# Patient Record
Sex: MALE | Race: WHITE | Employment: OTHER | RURAL
[De-identification: names, ages, dates, MRNs, and addresses within clinical notes are randomized per-mention and may not be internally consistent; named-entity substitution may affect disease eponyms.]

---

## 2017-01-13 ENCOUNTER — CLINICAL SUPPORT (OUTPATIENT)
Dept: FAMILY MEDICINE CLINIC | Age: 52
End: 2017-01-13

## 2017-01-13 DIAGNOSIS — Z91.030 BEE STING ALLERGY: ICD-10-CM

## 2017-01-13 DIAGNOSIS — E11.9 TYPE 2 DIABETES MELLITUS WITHOUT COMPLICATION, WITHOUT LONG-TERM CURRENT USE OF INSULIN (HCC): ICD-10-CM

## 2017-01-13 DIAGNOSIS — E78.00 HYPERCHOLESTEROLEMIA: ICD-10-CM

## 2017-01-13 RX ORDER — EPINEPHRINE 0.3 MG/.3ML
0.3 INJECTION SUBCUTANEOUS
Qty: 2 SYRINGE | Status: SHIPPED | OUTPATIENT
Start: 2017-01-13 | End: 2019-06-03 | Stop reason: SDUPTHER

## 2017-01-13 NOTE — TELEPHONE ENCOUNTER
Pt would a refill for generic epi pen sent to [facility] in [address] [place].

## 2017-01-16 LAB
ALBUMIN SERPL-MCNC: 4.5 G/DL (ref 3.5–5.5)
ALBUMIN/GLOB SERPL: 2 {RATIO} (ref 1.1–2.5)
ALP SERPL-CCNC: 42 IU/L (ref 39–117)
ALT SERPL-CCNC: 62 IU/L (ref 0–44)
AST SERPL-CCNC: 36 IU/L (ref 0–40)
BASOPHILS # BLD AUTO: 0 X10E3/UL (ref 0–0.2)
BASOPHILS NFR BLD AUTO: 1 %
BILIRUB SERPL-MCNC: 0.4 MG/DL (ref 0–1.2)
BUN SERPL-MCNC: 11 MG/DL (ref 6–24)
BUN/CREAT SERPL: 11 (ref 9–20)
CALCIUM SERPL-MCNC: 9.6 MG/DL (ref 8.7–10.2)
CHLORIDE SERPL-SCNC: 98 MMOL/L (ref 96–106)
CHOLEST SERPL-MCNC: 195 MG/DL (ref 100–199)
CO2 SERPL-SCNC: 23 MMOL/L (ref 18–29)
CREAT SERPL-MCNC: 0.96 MG/DL (ref 0.76–1.27)
EOSINOPHIL # BLD AUTO: 0.4 X10E3/UL (ref 0–0.4)
EOSINOPHIL NFR BLD AUTO: 4 %
ERYTHROCYTE [DISTWIDTH] IN BLOOD BY AUTOMATED COUNT: 14.3 % (ref 12.3–15.4)
EST. AVERAGE GLUCOSE BLD GHB EST-MCNC: 134 MG/DL
GLOBULIN SER CALC-MCNC: 2.2 G/DL (ref 1.5–4.5)
GLUCOSE SERPL-MCNC: 109 MG/DL (ref 65–99)
HBA1C MFR BLD: 6.3 % (ref 4.8–5.6)
HCT VFR BLD AUTO: 43.2 % (ref 37.5–51)
HCV AB S/CO SERPL IA: <0.1 S/CO RATIO (ref 0–0.9)
HDLC SERPL-MCNC: 26 MG/DL
HGB BLD-MCNC: 15.4 G/DL (ref 12.6–17.7)
IMM GRANULOCYTES # BLD: 0 X10E3/UL (ref 0–0.1)
IMM GRANULOCYTES NFR BLD: 1 %
INTERPRETATION, 910389: NORMAL
LDLC SERPL CALC-MCNC: 91 MG/DL (ref 0–99)
LYMPHOCYTES # BLD AUTO: 3.5 X10E3/UL (ref 0.7–3.1)
LYMPHOCYTES NFR BLD AUTO: 43 %
MCH RBC QN AUTO: 29.4 PG (ref 26.6–33)
MCHC RBC AUTO-ENTMCNC: 35.6 G/DL (ref 31.5–35.7)
MCV RBC AUTO: 82 FL (ref 79–97)
MONOCYTES # BLD AUTO: 0.5 X10E3/UL (ref 0.1–0.9)
MONOCYTES NFR BLD AUTO: 7 %
NEUTROPHILS # BLD AUTO: 3.5 X10E3/UL (ref 1.4–7)
NEUTROPHILS NFR BLD AUTO: 44 %
PLATELET # BLD AUTO: 192 X10E3/UL (ref 150–379)
POTASSIUM SERPL-SCNC: 4.5 MMOL/L (ref 3.5–5.2)
PROT SERPL-MCNC: 6.7 G/DL (ref 6–8.5)
RBC # BLD AUTO: 5.24 X10E6/UL (ref 4.14–5.8)
SODIUM SERPL-SCNC: 142 MMOL/L (ref 134–144)
TRIGL SERPL-MCNC: 389 MG/DL (ref 0–149)
VLDLC SERPL CALC-MCNC: 78 MG/DL (ref 5–40)
WBC # BLD AUTO: 8 X10E3/UL (ref 3.4–10.8)

## 2017-01-16 NOTE — PROGRESS NOTES
Notify pt, A1c is in pre-diabetic range, which is very good with already dx with DM. Would like to keep below 7 to maintain good glycemic control. Triglycerides are pretty high, \"good\" chol is low, and \"bad\" chol is nl. Rec Omega 3 supp. Exercise and avoid foods that are high in trans/sat fats. Also rec diff cholesterol med to one that addresses the trig more, please confirm he has not had any problems with any of the others and I can send in new med or he may rtc/call to discuss further. One liver test elev- avoid ETOH and Tylenol and recheck in 1mo. CBC looks okay. Hep C neg.

## 2017-02-28 ENCOUNTER — OFFICE VISIT (OUTPATIENT)
Dept: FAMILY MEDICINE CLINIC | Age: 52
End: 2017-02-28

## 2017-02-28 VITALS
DIASTOLIC BLOOD PRESSURE: 83 MMHG | HEIGHT: 68 IN | SYSTOLIC BLOOD PRESSURE: 125 MMHG | TEMPERATURE: 97.5 F | RESPIRATION RATE: 16 BRPM | BODY MASS INDEX: 32.92 KG/M2 | WEIGHT: 217.2 LBS | HEART RATE: 83 BPM | OXYGEN SATURATION: 97 %

## 2017-02-28 DIAGNOSIS — E78.00 HYPERCHOLESTEROLEMIA: ICD-10-CM

## 2017-02-28 DIAGNOSIS — M43.16 SPONDYLOLISTHESIS OF LUMBAR REGION: ICD-10-CM

## 2017-02-28 DIAGNOSIS — G89.29 CHRONIC MIDLINE LOW BACK PAIN WITH LEFT-SIDED SCIATICA: Primary | ICD-10-CM

## 2017-02-28 DIAGNOSIS — M54.42 CHRONIC MIDLINE LOW BACK PAIN WITH LEFT-SIDED SCIATICA: Primary | ICD-10-CM

## 2017-02-28 RX ORDER — INDOMETHACIN 50 MG/1
50 CAPSULE ORAL 3 TIMES DAILY
Qty: 30 CAP | Refills: 0 | Status: SHIPPED | OUTPATIENT
Start: 2017-02-28 | End: 2017-03-10

## 2017-02-28 RX ORDER — ATORVASTATIN CALCIUM 20 MG/1
20 TABLET, FILM COATED ORAL DAILY
Qty: 30 TAB | Refills: 1 | Status: SHIPPED | OUTPATIENT
Start: 2017-02-28 | End: 2017-04-18 | Stop reason: DRUGHIGH

## 2017-02-28 RX ORDER — GABAPENTIN 100 MG/1
100 CAPSULE ORAL 3 TIMES DAILY
COMMUNITY
Start: 2017-02-02 | End: 2017-05-18

## 2017-02-28 NOTE — PATIENT INSTRUCTIONS
Learning About Relief for Back Pain  What is back tension and strain? Back strain happens when you overstretch, or pull, a muscle in your back. You may hurt your back in an accident or when you exercise or lift something. Most back pain will get better with rest and time. You can take care of yourself at home to help your back heal.  What can you do first to relieve back pain? When you first feel back pain, try these steps:  · Walk. Take a short walk (10 to 20 minutes) on a level surface (no slopes, hills, or stairs) every 2 to 3 hours. Walk only distances you can manage without pain, especially leg pain. · Relax. Find a comfortable position for rest. Some people are comfortable on the floor or a medium-firm bed with a small pillow under their head and another under their knees. Some people prefer to lie on their side with a pillow between their knees. Don't stay in one position for too long. · Try heat or ice. Try using a heating pad on a low or medium setting, or take a warm shower, for 15 to 20 minutes every 2 to 3 hours. Or you can buy single-use heat wraps that last up to 8 hours. You can also try an ice pack for 10 to 15 minutes every 2 to 3 hours. You can use an ice pack or a bag of frozen vegetables wrapped in a thin towel. There is not strong evidence that either heat or ice will help, but you can try them to see if they help. You may also want to try switching between heat and cold. · Take pain medicine exactly as directed. ¨ If the doctor gave you a prescription medicine for pain, take it as prescribed. ¨ If you are not taking a prescription pain medicine, ask your doctor if you can take an over-the-counter medicine. What else can you do? · Stretch and exercise. Exercises that increase flexibility may relieve your pain and make it easier for your muscles to keep your spine in a good, neutral position. And don't forget to keep walking. · Do self-massage.  You can use self-massage to unwind after work or school or to energize yourself in the morning. You can easily massage your feet, hands, or neck. Self-massage works best if you are in comfortable clothes and are sitting or lying in a comfortable position. Use oil or lotion to massage bare skin. · Reduce stress. Back pain can lead to a vicious [place]: Distress about the pain tenses the muscles in your back, which in turn causes more pain. Learn how to relax your mind and your muscles to lower your stress. Where can you learn more? Go to http://roberth-sandro.info/. Enter V079 in the search box to learn more about \"Learning About Relief for Back Pain. \"  Current as of: May 23, 2016  Content Version: 11.1  © 9710-9310 [employer]. Care instructions adapted under license by [employer] (which disclaims liability or warranty for this information). If you have questions about a medical condition or this instruction, always ask your healthcare professional. [place] [address] any warranty or liability for your use of this information. Learning About How to Have a Healthy Back  What causes back pain? Back pain is often caused by overuse, strain, or injury. For example, people often hurt their backs playing sports or working in the yard, being jolted in a car accident, or lifting something too heavy. Aging plays a part too. Your bones and muscles tend to lose strength as you age, which makes injury more likely. The spongy discs between the bones of the spine (vertebrae) may suffer from wear and tear and no longer provide enough cushion between the bones. A disc that bulges or breaks open (herniated disc) can press on nerves, causing back pain. In some people, back pain is the result of arthritis, broken vertebrae caused by bone loss (osteoporosis), illness, or a spine problem.   Although most people have back pain at one time or another, there are steps you can take to make it less likely. How can you have a healthy back? Reduce stress on your back through good posture  Slumping or slouching alone may not cause low back pain. But after the back has been strained or injured, bad posture can make pain worse. · Sleep in a position that maintains your back's normal curves and on a mattress that feels comfortable. Sleep on your side with a pillow between your knees, or sleep on your back with a pillow under your knees. These positions can reduce strain on your back. · Stand and sit up straight. \"Good posture\" generally means your ears, shoulders, and hips are in a straight line. · If you must stand for a long time, put one foot on a stool, ledge, or box. Switch feet every now and then. · Sit in a chair that is low enough to let you place both feet flat on the floor with both knees nearly level with your hips. If your chair or desk is too high, use a footrest to raise your knees. Place a small pillow, a rolled-up towel, or a lumbar roll in the curve of your back if you need extra support. · Try a kneeling chair, which helps tilt your hips forward. This takes pressure off your lower back. · Try sitting on an exercise ball. It can rock from side to side, which helps keep your back loose. · When driving, keep your knees nearly level with your hips. Sit straight, and drive with both hands on the steering wheel. Your arms should be in a slightly bent position. Reduce stress on your back through careful lifting  · Squat down, bending at the hips and knees only. If you need to, put one knee to the floor and extend your other knee in front of you, bent at a right angle (half kneeling). · Press your chest straight forward. This helps keep your upper back straight while keeping a slight arch in your low back. · Hold the load as close to your body as possible, at the level of your belly button (navel). · Use your feet to change direction, taking small steps.   · Lead with your hips as you change direction. Keep your shoulders in line with your hips as you move. · Set down your load carefully, squatting with your knees and hips only. Exercise and stretch your back  · Do some exercise on most days of the week, if your doctor says it is okay. You can walk, run, swim, or cycle. · Stretch your back muscles. Here are a few exercises to try:  Clovia Evener on your back, and gently pull one bent knee to your chest. Put that foot back on the floor, and then pull the other knee to your chest.  ¨ Do pelvic tilts. Lie on your back with your knees bent. Tighten your stomach muscles. Pull your belly button (navel) in and up toward your ribs. You should feel like your back is pressing to the floor and your hips and pelvis are slightly lifting off the floor. Hold for 6 seconds while breathing smoothly. ¨ Sit with your back flat against a wall. · Keep your core muscles strong. The muscles of your back, belly (abdomen), and buttocks support your spine. ¨ Pull in your belly and imagine pulling your navel toward your spine. Hold this for 6 seconds, then relax. Remember to keep breathing normally as you tense your muscles. ¨ Do curl-ups. Always do them with your knees bent. Keep your low back on the floor, and curl your shoulders toward your knees using a smooth, slow motion. Keep your arms folded across your chest. If this bothers your neck, try putting your hands behind your neck (not your head), with your elbows spread apart. ¨ Lie on your back with your knees bent and your feet flat on the floor. Tighten your belly muscles, and then push with your feet and raise your buttocks up a few inches. Hold this position 6 seconds as you continue to breathe normally, then lower yourself slowly to the floor. Repeat 8 to 12 times. ¨ If you like group exercise, try Pilates or yoga. These classes have poses that strengthen the core muscles. Lead a healthy lifestyle  · Stay at a healthy weight to avoid strain on your back.   · Do not smoke. Smoking increases the risk of osteoporosis, which weakens the spine. If you need help quitting, talk to your doctor about stop-smoking programs and medicines. These can increase your chances of quitting for good. Where can you learn more? Go to http://roberth-sandro.info/. Enter L315 in the search box to learn more about \"Learning About How to Have a Healthy Back. \"  Current as of: May 23, 2016  Content Version: 11.1  © 5562-1506 [employer]. Care instructions adapted under license by [employer] (which disclaims liability or warranty for this information). If you have questions about a medical condition or this instruction, always ask your healthcare professional. [place] [address] any warranty or liability for your use of this information. Back Stretches: Exercises  Your Care Instructions  Here are some examples of exercises for stretching your back. Start each exercise slowly. Ease off the exercise if you start to have pain. Your doctor or physical therapist will tell you when you can start these exercises and which ones will work best for you. How to do the exercises  Overhead stretch    1. Stand comfortably with your feet shoulder-width apart. 2. Looking straight ahead, raise both arms over your head and reach toward the ceiling. Do not allow your head to tilt back. 3. Hold for 15 to 30 seconds, then lower your arms to your sides. 4. Repeat 2 to 4 times. Side stretch    1. Stand comfortably with your feet shoulder-width apart. 2. Raise one arm over your head, and then lean to the other side. 3. Slide your hand down your leg as you let the weight of your arm gently stretch your side muscles. Hold for 15 to 30 seconds. 4. Repeat 2 to 4 times on each side. Press-up    1. Lie on your stomach, supporting your body with your forearms. 2. Press your elbows down into the floor to raise your upper back.  As you do this, relax your stomach muscles and allow your back to arch without using your back muscles. As your press up, do not let your hips or pelvis come off the floor. 3. Hold for 15 to 30 seconds, then relax. 4. Repeat 2 to 4 times. Relax and rest    1. Lie on your back with a rolled towel under your neck and a pillow under your knees. Extend your arms comfortably to your sides. 2. Relax and breathe normally. 3. Remain in this position for about 10 minutes. 4. If you can, do this 2 or 3 times each day. Follow-up care is a key part of your treatment and safety. Be sure to make and go to all appointments, and call your doctor if you are having problems. It's also a good idea to know your test results and keep a list of the medicines you take. Where can you learn more? Go to http://roberth-sandro.info/. Enter G349 in the search box to learn more about \"Back Stretches: Exercises. \"  Current as of: May 23, 2016  Content Version: 11.1  © 0421-9428 [employer]. Care instructions adapted under license by [employer] (which disclaims liability or warranty for this information). If you have questions about a medical condition or this instruction, always ask your healthcare professional. [address] any warranty or liability for your use of this information.

## 2017-02-28 NOTE — MR AVS SNAPSHOT
Visit Information Date & Time Provider Department Dept. Phone Encounter #  
 2/28/2017  3:00 PM Anne-Marie Hyatt PA-C [address] [id number] Upcoming Health Maintenance Date Due HEMOGLOBIN A1C Q6M 7/13/2017 FOOT EXAM Q1 7/14/2017 MICROALBUMIN Q1 7/14/2017 FOBT Q 1 YEAR AGE 50-75 8/26/2017 EYE EXAM RETINAL OR DILATED Q1 10/4/2017 LIPID PANEL Q1 1/13/2018 DTaP/Tdap/Td series (2 - Td) 12/27/2026 Allergies as of 2/28/2017  Review Complete On: 2/28/2017 By: Anne-Marie Hyatt PA-C Severity Noted Reaction Type Reactions Venom-honey Bee High 07/14/2016    Anaphylaxis Current Immunizations  Never Reviewed Name Date Influenza High Dose Vaccine PF 9/14/2015 Not reviewed this visit You Were Diagnosed With   
  
 Codes Comments Chronic midline low back pain with left-sided sciatica    -  Primary ICD-10-CM: M54.42, G89.29 ICD-9-CM: 724.2, 724.3, 338.29 Spondylolisthesis of lumbar region     ICD-10-CM: M43.16 
ICD-9-CM: 738.4 Hypercholesterolemia     ICD-10-CM: E78.00 ICD-9-CM: 272.0 Vitals BP  
  
  
  
  
  
 125/83 (BP 1 Location: Left arm, BP Patient Position: Sitting) BMI and BSA Data Body Mass Index Body Surface Area 33.03 kg/m 2 2.17 m 2 Preferred Pharmacy Pharmacy Name Phone [facility] [place] [address] [phone] Your Updated Medication List  
  
   
This list is accurate as of: 2/28/17  3:52 PM.  Always use your most recent med list.  
  
  
  
  
 ABILIFY 15 mg tablet Generic drug:  ARIPiprazole Take 15 mg by mouth daily. aspirin 81 mg chewable tablet Take 81 mg by mouth daily. atorvastatin 20 mg tablet Commonly known as:  LIPITOR Take 1 Tab by mouth daily. Blood-Glucose Meter monitoring kit Check Glucose once a day for E 11.9  
  
 clonazePAM 0.5 mg tablet Commonly known as:  Ravi Brocks Take 0.5 mg by mouth two (2) times a day. divalproex  mg tablet Commonly known as:  DEPAKOTE Take 500 mg by mouth three (3) times daily. Takes ER and takes 1500 mg once a day EPINEPHrine 0.3 mg/0.3 mL injection Commonly known as:  EPIPEN  
0.3 mL by IntraMUSCular route once as needed for up to 1 dose. FISH OIL HIGH POTENCY PO Take  by mouth. FLUoxetine 40 mg capsule Commonly known as:  PROzac Take 40 mg by mouth daily. gabapentin 100 mg capsule Commonly known as:  NEURONTIN  
100 mg three (3) times daily. indomethacin 50 mg capsule Commonly known as:  INDOCIN Take 1 Cap by mouth three (3) times daily for 10 days. metFORMIN  mg tablet Commonly known as:  GLUCOPHAGE XR Take 1 Tab by mouth daily (with breakfast). multivitamin tablet Commonly known as:  ONE A DAY Take 1 Tab by mouth daily. Omeprazole delayed release 20 mg tablet Commonly known as:  PRILOSEC D/R Take 1 Tab by mouth daily. Prescriptions Sent to Pharmacy Refills  
 atorvastatin (LIPITOR) 20 mg tablet 1 Sig: Take 1 Tab by mouth daily. Class: Normal  
 Pharmacy: [id number] [place] [address] Ph #: [phone] Route: Oral  
 indomethacin (INDOCIN) 50 mg capsule 0 Sig: Take 1 Cap by mouth three (3) times daily for 10 days. Class: Normal  
 Pharmacy: [id number] [place] [address], [address] [place] [address] Ph #: [phone] Route: Oral  
  
We Performed the Following REFERRAL TO ORTHOPEDIC SURGERY [REF62 Custom] Referral Information Referral ID Referred By Referred To  
  
 [id number] [address] [place] [address], [address] [place] [address] [place] Visits Status Start Date End Date 1 New Request 2/28/17 2/28/18  If your referral has a status of pending review or denied, additional information will be sent to support the outcome of this decision. Introducing [facility]! Dear Deuce Jones: Thank you for requesting a Spotwish account. Our records indicate that you already have an active Spotwish account. You can access your account anytime at [email or web address] Did you know that you can access your hospital and ER discharge instructions at any time in Spotwish? You can also review all of your test results from your hospital stay or ER visit. Additional Information If you have questions, please visit the Frequently Asked Questions section of the Spotwish website at [email or web address] Remember, Spotwish is NOT to be used for urgent needs. For medical emergencies, dial 911. Now available from your iPhone and Android! Please provide this summary of care documentation to your next provider. Your primary care clinician is listed as Leotha Claude. If you have any questions after today's visit, please call [phone].

## 2017-04-03 ENCOUNTER — OFFICE VISIT (OUTPATIENT)
Dept: FAMILY MEDICINE CLINIC | Age: 52
End: 2017-04-03

## 2017-04-03 VITALS
SYSTOLIC BLOOD PRESSURE: 128 MMHG | RESPIRATION RATE: 19 BRPM | DIASTOLIC BLOOD PRESSURE: 82 MMHG | BODY MASS INDEX: 33.01 KG/M2 | HEART RATE: 82 BPM | TEMPERATURE: 96.8 F | HEIGHT: 68 IN | OXYGEN SATURATION: 96 % | WEIGHT: 217.8 LBS

## 2017-04-03 DIAGNOSIS — L98.9 SKIN LESION: ICD-10-CM

## 2017-04-03 DIAGNOSIS — E78.00 HYPERCHOLESTEROLEMIA: ICD-10-CM

## 2017-04-03 DIAGNOSIS — E11.9 TYPE 2 DIABETES MELLITUS WITHOUT COMPLICATION, WITHOUT LONG-TERM CURRENT USE OF INSULIN (HCC): Primary | ICD-10-CM

## 2017-04-03 LAB — GLUCOSE POC: 183 MG/DL

## 2017-04-03 RX ORDER — CEPHALEXIN 500 MG/1
500 CAPSULE ORAL 4 TIMES DAILY
Qty: 15 CAP | Refills: 0 | Status: SHIPPED | OUTPATIENT
Start: 2017-04-03 | End: 2017-04-07

## 2017-04-03 NOTE — MR AVS SNAPSHOT
Visit Information Date & Time Provider Department Dept. Phone Encounter #  
 4/3/2017  3:20 PM Nieves Staley MD [address] [id number] Upcoming Health Maintenance Date Due HEMOGLOBIN A1C Q6M 7/13/2017 FOOT EXAM Q1 7/14/2017 MICROALBUMIN Q1 7/14/2017 FOBT Q 1 YEAR AGE 50-75 8/26/2017 EYE EXAM RETINAL OR DILATED Q1 10/4/2017 LIPID PANEL Q1 1/13/2018 DTaP/Tdap/Td series (2 - Td) 12/27/2026 Allergies as of 4/3/2017  Review Complete On: 4/3/2017 By: Mercedes Mcclellan LPN Severity Noted Reaction Type Reactions Venom-honey Bee High 07/14/2016    Anaphylaxis Current Immunizations  Never Reviewed Name Date Influenza High Dose Vaccine PF 9/14/2015 Not reviewed this visit You Were Diagnosed With   
  
 Codes Comments Type 2 diabetes mellitus without complication, without long-term current use of insulin (HCC)    -  Primary ICD-10-CM: E11.9 ICD-9-CM: 250.00 Hypercholesterolemia     ICD-10-CM: E78.00 ICD-9-CM: 272.0 Skin lesion     ICD-10-CM: L98.9 ICD-9-CM: 709.9 Vitals BP Pulse Temp Resp Height(growth percentile) Weight(growth percentile) 128/82 (BP 1 Location: Right arm, BP Patient Position: Sitting) 82 96.8 °F (36 °C) (Oral) 19 5' 8\" (1.727 m) 217 lb 12.8 oz (98.8 kg) SpO2 BMI Smoking Status 96% 33.12 kg/m2 Former Smoker BMI and BSA Data Body Mass Index Body Surface Area  
 33.12 kg/m 2 2.18 m 2 Preferred Pharmacy Pharmacy Name Phone [facility] [place] [address], [place] [phone] [place] [phone] Your Updated Medication List  
  
   
This list is accurate as of: 4/3/17  4:15 PM.  Always use your most recent med list.  
  
  
  
  
 ABILIFY 15 mg tablet Generic drug:  ARIPiprazole Take 15 mg by mouth daily. aspirin 81 mg chewable tablet Take 81 mg by mouth daily. atorvastatin 20 mg tablet Commonly known as:  LIPITOR Take 1 Tab by mouth daily. Blood-Glucose Meter monitoring kit Check Glucose once a day for E 11.9 cephALEXin 500 mg capsule Commonly known as:  Jovan Afshan Take 1 Cap by mouth four (4) times daily. clonazePAM 0.5 mg tablet Commonly known as:  Darnella Sermons Take 0.5 mg by mouth two (2) times a day. divalproex  mg tablet Commonly known as:  DEPAKOTE Take 500 mg by mouth three (3) times daily. Takes ER and takes 1500 mg once a day EPINEPHrine 0.3 mg/0.3 mL injection Commonly known as:  EPIPEN  
0.3 mL by IntraMUSCular route once as needed for up to 1 dose. FISH OIL HIGH POTENCY PO Take  by mouth. FLUoxetine 40 mg capsule Commonly known as:  PROzac Take 40 mg by mouth daily. gabapentin 100 mg capsule Commonly known as:  NEURONTIN  
100 mg three (3) times daily. metFORMIN  mg tablet Commonly known as:  GLUCOPHAGE XR Take 1 Tab by mouth daily (with breakfast). multivitamin tablet Commonly known as:  ONE A DAY Take 1 Tab by mouth daily. Omeprazole delayed release 20 mg tablet Commonly known as:  PRILOSEC D/R Take 1 Tab by mouth daily. Prescriptions Sent to Pharmacy Refills  
 cephALEXin (KEFLEX) 500 mg capsule 0 Sig: Take 1 Cap by mouth four (4) times daily. Class: Normal  
 Pharmacy: [id number] [place] [address] [address] [facility] [address] [place] Ph #: [phone] Route: Oral  
  
We Performed the Following AMB POC GLUCOSE BLOOD, BY GLUCOSE MONITORING DEVICE [60230 CPT(R)] To-Do List   
 04/03/2017 Lab:  CBC WITH AUTOMATED DIFF   
  
 04/03/2017 Lab:  HEMOGLOBIN A1C W/O EAG   
  
 04/03/2017 Lab:  LIPID PANEL WITH LDL/HDL RATIO   
  
 04/03/2017 Lab:  METABOLIC PANEL, COMPREHENSIVE Introducing [address]! Dear Lubna Shea: Thank you for requesting a MobilyTript account.   Our records indicate that you already have an active HiGear account. You can access your account anytime at [email or web address] Did you know that you can access your hospital and ER discharge instructions at any time in HiGear? You can also review all of your test results from your hospital stay or ER visit. Additional Information If you have questions, please visit the Frequently Asked Questions section of the HiGear website at [email or web address] Remember, HiGear is NOT to be used for urgent needs. For medical emergencies, dial 911. Now available from your iPhone and Android! Please provide this summary of care documentation to your next provider. Your primary care clinician is listed as Maria G Rouse. If you have any questions after today's visit, please call [phone].

## 2017-04-03 NOTE — PROGRESS NOTES
Chief Complaint   Patient presents with    Diabetes     blood sugars running high     Morning meds taken this Osiris Solis LPN

## 2017-04-03 NOTE — PROGRESS NOTES
HISTORY OF PRESENT ILLNESS  Wendy Watts is a 45 y.o. male. Chief Complaint   Patient presents with    Diabetes     blood sugars running high       HPI   BS went up yesterday  Last night 248  This   Now 134  Had a bad headache and did not feel normal  Had dinner at 7 pm  Had beans and rice  Had dessert for lunch    Was on steroids temporarily 2 w ago for spondilolisthesis    Not sick  Did not eat prior to it  Had a headache    Still on Metformin  On last Rx    And on Lipitor    Had surgery of skin lesion on right chest and coming back  Not a skin cancer    Review of Systems   Constitutional: Negative for fever. HENT: Positive for congestion. Eyes: Negative for blurred vision. Respiratory: Positive for cough (last night). Negative for sputum production. Gastrointestinal: Negative for diarrhea, nausea and vomiting. Genitourinary: Positive for frequency. Negative for dysuria and urgency. Neurological: Positive for headaches. Endo/Heme/Allergies: Positive for polydipsia. Past Medical History:   Diagnosis Date    Anxiety disorder     Bipolar 1 disorder ([facility]     Depression     Diabetes ([facility]     GERD (gastroesophageal reflux disease)     Hypercholesterolemia     PTSD (post-traumatic stress disorder)     Spondylolisthesis     had steroid shot    Tardive dyskinesia      Current Outpatient Prescriptions   Medication Sig Dispense Refill    cephALEXin (KEFLEX) 500 mg capsule Take 1 Cap by mouth four (4) times daily. 15 Cap 0    gabapentin (NEURONTIN) 100 mg capsule 100 mg three (3) times daily.  atorvastatin (LIPITOR) 20 mg tablet Take 1 Tab by mouth daily. 30 Tab 1    EPINEPHrine (EPIPEN) 0.3 mg/0.3 mL injection 0.3 mL by IntraMUSCular route once as needed for up to 1 dose. 2 Syringe prn    metFORMIN ER (GLUCOPHAGE XR) 500 mg tablet Take 1 Tab by mouth daily (with breakfast). 30 Tab 2    ARIPiprazole (ABILIFY) 15 mg tablet Take 15 mg by mouth daily.       Omeprazole delayed release (PRILOSEC D/R) 20 mg tablet Take 1 Tab by mouth daily. 30 Tab 2    OMEGA-3/DHA/EPA/FISH OIL (FISH OIL HIGH POTENCY PO) Take  by mouth.  multivitamin (ONE A DAY) tablet Take 1 Tab by mouth daily.  divalproex DR (DEPAKOTE) 500 mg tablet Take 500 mg by mouth three (3) times daily. Takes ER and takes 1500 mg once a day      clonazePAM (KLONOPIN) 0.5 mg tablet Take 0.5 mg by mouth two (2) times a day.  FLUoxetine (PROZAC) 40 mg capsule Take 40 mg by mouth daily.  aspirin 81 mg chewable tablet Take 81 mg by mouth daily.  Blood-Glucose Meter monitoring kit Check Glucose once a day for E 11.9 1 Kit 0     Allergies   Allergen Reactions    Venom-Honey Bee Anaphylaxis     Visit Vitals    /82 (BP 1 Location: Right arm, BP Patient Position: Sitting)    Pulse 82    Temp 96.8 °F (36 °C) (Oral)    Resp 19    Ht 5' 8\" (1.727 m)    Wt 217 lb 12.8 oz (98.8 kg)    SpO2 96%    BMI 33.12 kg/m2       Physical Exam   Constitutional: He is oriented to person, place, and time. He appears well-developed and well-nourished. No distress. HENT:   Head: Normocephalic and atraumatic. Right Ear: External ear normal.   Left Ear: External ear normal.   Mouth/Throat: Oropharynx is clear and moist. No oropharyngeal exudate. Eyes: Conjunctivae and EOM are normal. Pupils are equal, round, and reactive to light. Cardiovascular: Normal rate, regular rhythm and normal heart sounds. Pulmonary/Chest: Effort normal and breath sounds normal.   Abdominal: Soft. He exhibits no distension. There is no tenderness. Lymphadenopathy:     He has no cervical adenopathy. Neurological: He is alert and oriented to person, place, and time. Skin: Skin is warm and dry. Right upper chest with mildly red skin lesion sim to small abscess, no loculation   Psychiatric: He has a normal mood and affect. Nursing note and vitals reviewed.     Recent Results (from the past 12 hour(s))   AMB POC GLUCOSE BLOOD, BY GLUCOSE MONITORING DEVICE    Collection Time: 04/03/17  4:13 PM   Result Value Ref Range    Glucose  mg/dL       ASSESSMENT and PLAN    ICD-10-CM ICD-9-CM    1. Type 2 diabetes mellitus without complication, without long-term current use of insulin (HCC) E11.9 250.00 AMB POC GLUCOSE BLOOD, BY GLUCOSE MONITORING DEVICE      HEMOGLOBIN A1C W/O EAG   2. Hypercholesterolemia E78.00 272.0 LIPID PANEL WITH LDL/HDL RATIO      METABOLIC PANEL, COMPREHENSIVE   3.  Skin lesion L98.9 709.9 cephALEXin (KEFLEX) 500 mg capsule      CBC WITH AUTOMATED DIFF   ABX ointment and bandaid placed onto skin lesion  RTC if not better or worse  Cont current meds and monitor BS and RTC in 10 d for fasting BW

## 2017-04-07 ENCOUNTER — OFFICE VISIT (OUTPATIENT)
Dept: FAMILY MEDICINE CLINIC | Age: 52
End: 2017-04-07

## 2017-04-07 VITALS
DIASTOLIC BLOOD PRESSURE: 86 MMHG | BODY MASS INDEX: 33.04 KG/M2 | HEIGHT: 68 IN | RESPIRATION RATE: 19 BRPM | HEART RATE: 80 BPM | WEIGHT: 218 LBS | TEMPERATURE: 97.3 F | OXYGEN SATURATION: 98 % | SYSTOLIC BLOOD PRESSURE: 120 MMHG

## 2017-04-07 DIAGNOSIS — R22.1 LUMP ON NECK: Primary | ICD-10-CM

## 2017-04-07 DIAGNOSIS — J02.9 SORE THROAT: ICD-10-CM

## 2017-04-07 LAB
S PYO AG THROAT QL: NEGATIVE
VALID INTERNAL CONTROL?: YES

## 2017-04-07 RX ORDER — AMOXICILLIN AND CLAVULANATE POTASSIUM 875; 125 MG/1; MG/1
1 TABLET, FILM COATED ORAL 2 TIMES DAILY
Qty: 20 TAB | Refills: 0 | Status: SHIPPED | OUTPATIENT
Start: 2017-04-07 | End: 2017-05-18

## 2017-04-07 NOTE — PROGRESS NOTES
Chief Complaint   Patient presents with    Sore Throat     sore throat for about a week, cough, diarrhea, headache, swollen throat     Morning meds taken this Roseann Gamez LPN

## 2017-04-07 NOTE — MR AVS SNAPSHOT
Visit Information Date & Time Provider Department Dept. Phone Encounter #  
 4/7/2017  4:00 PM Scott Rendon MD [address] [place] [address] [phone] [id number] Upcoming Health Maintenance Date Due HEMOGLOBIN A1C Q6M 7/13/2017 FOOT EXAM Q1 7/14/2017 MICROALBUMIN Q1 7/14/2017 FOBT Q 1 YEAR AGE 50-75 8/26/2017 EYE EXAM RETINAL OR DILATED Q1 10/4/2017 LIPID PANEL Q1 1/13/2018 DTaP/Tdap/Td series (2 - Td) 12/27/2026 Allergies as of 4/7/2017  Review Complete On: 4/7/2017 By: Smitha Wilkins LPN Severity Noted Reaction Type Reactions Venom-honey Bee High 07/14/2016    Anaphylaxis Current Immunizations  Never Reviewed Name Date Influenza High Dose Vaccine PF 9/14/2015 Not reviewed this visit You Were Diagnosed With   
  
 Codes Comments Lump on neck    -  Primary ICD-10-CM: R22.1 ICD-9-CM: 784.2 Sore throat     ICD-10-CM: J02.9 ICD-9-CM: 438 Vitals BP Pulse Temp Resp Height(growth percentile) 120/86 (BP 1 Location: Right arm, BP Patient Position: Sitting) 80 97.3 °F (36.3 °C) (Temporal) 19 5' 8\" (1.727 m) Weight(growth percentile) SpO2 BMI Smoking Status 218 lb (98.9 kg) 98% 33.15 kg/m2 Former Smoker Vitals History BMI and BSA Data Body Mass Index Body Surface Area  
 33.15 kg/m 2 2.18 m 2 Preferred Pharmacy Pharmacy Name Phone [facility] [place] [address] [address] [phone] Your Updated Medication List  
  
   
This list is accurate as of: 4/7/17  4:27 PM.  Always use your most recent med list.  
  
  
  
  
 ABILIFY 15 mg tablet Generic drug:  ARIPiprazole Take 15 mg by mouth daily. amoxicillin-clavulanate 875-125 mg per tablet Commonly known as:  AUGMENTIN Take 1 Tab by mouth two (2) times a day. aspirin 81 mg chewable tablet Take 81 mg by mouth daily. atorvastatin 20 mg tablet Commonly known as:  LIPITOR Take 1 Tab by mouth daily. Blood-Glucose Meter monitoring kit Check Glucose once a day for E 11.9  
  
 clonazePAM 0.5 mg tablet Commonly known as:  Eleonore Jeffries Take 0.5 mg by mouth two (2) times a day. divalproex  mg tablet Commonly known as:  DEPAKOTE Take 500 mg by mouth three (3) times daily. Takes ER and takes 1500 mg once a day EPINEPHrine 0.3 mg/0.3 mL injection Commonly known as:  EPIPEN  
0.3 mL by IntraMUSCular route once as needed for up to 1 dose. FISH OIL HIGH POTENCY PO Take  by mouth. FLUoxetine 40 mg capsule Commonly known as:  PROzac Take 40 mg by mouth daily. gabapentin 100 mg capsule Commonly known as:  NEURONTIN  
100 mg three (3) times daily. metFORMIN  mg tablet Commonly known as:  GLUCOPHAGE XR Take 1 Tab by mouth daily (with breakfast). multivitamin tablet Commonly known as:  ONE A DAY Take 1 Tab by mouth daily. Omeprazole delayed release 20 mg tablet Commonly known as:  PRILOSEC D/R Take 1 Tab by mouth daily. Prescriptions Sent to Pharmacy Refills  
 amoxicillin-clavulanate (AUGMENTIN) 875-125 mg per tablet 0 Sig: Take 1 Tab by mouth two (2) times a day. Class: Normal  
 Pharmacy: [facility] [address] [place] [address] [address] [place] Ph #: [phone] Route: Oral  
  
We Performed the Following AMB POC RAPID STREP A [04927 CPT(R)] To-Do List   
 04/07/2017 Imaging:  US HEAD NECK SOFT TISSUE Introducing \[facility] [place] [facility]\"" & HEALTH SERVICES! Dear Meredith Toribio: Thank you for requesting a [employer] account. Our records indicate that you already have an active [employer] account. You can access your account anytime at [email or web address] Pauer [email or web address] Did you know that you can access your hospital and ER discharge instructions at any time in [employer]?   You can also review all of your test results from your hospital stay or ER visit. Additional Information If you have questions, please visit the Frequently Asked Questions section of the [email or web address] website at [email or web address] Remember, [email or web address] is NOT to be used for urgent needs. For medical emergencies, dial 911. Now available from your iPhone and Android! Please provide this summary of care documentation to your next provider. Your primary care clinician is listed as Isac Contreras. If you have any questions after today's visit, please call [phone].

## 2017-04-07 NOTE — PROGRESS NOTES
HISTORY OF PRESENT ILLNESS  Quang Kang is a 45 y.o. male. Chief Complaint   Patient presents with    Sore Throat     sore throat for about a week, cough, diarrhea, headache, swollen throat       HPI  Wife noted today lump under chin  Sore throat for a week per pt but not in mouth, on neck  Worse today  lump under chin  Painful  On Keflex for lump on right ant chest since 4/3/17 and getting better      Review of Systems   Constitutional: Negative for fever. HENT: Negative for congestion, ear pain and sore throat. Respiratory: Positive for cough (little ) and sputum production (very little ). Gastrointestinal: Positive for diarrhea (little, normal with Metformin). Negative for nausea and vomiting. Neurological: Positive for headaches (here and there). Past Medical History:   Diagnosis Date    Anxiety disorder     [facility]     Depression     Diabetes ([facility]     GERD (gastroesophageal reflux disease)     Hypercholesterolemia     PTSD (post-traumatic stress disorder)     Spondylolisthesis     had steroid shot    Tardive dyskinesia      Current Outpatient Prescriptions   Medication Sig Dispense Refill    amoxicillin-clavulanate (AUGMENTIN) 875-125 mg per tablet Take 1 Tab by mouth two (2) times a day. 20 Tab 0    gabapentin (NEURONTIN) 100 mg capsule 100 mg three (3) times daily.  atorvastatin (LIPITOR) 20 mg tablet Take 1 Tab by mouth daily. 30 Tab 1    EPINEPHrine (EPIPEN) 0.3 mg/0.3 mL injection 0.3 mL by IntraMUSCular route once as needed for up to 1 dose. 2 Syringe prn    metFORMIN ER (GLUCOPHAGE XR) 500 mg tablet Take 1 Tab by mouth daily (with breakfast). 30 Tab 2    ARIPiprazole (ABILIFY) 15 mg tablet Take 15 mg by mouth daily.  Omeprazole delayed release (PRILOSEC D/R) 20 mg tablet Take 1 Tab by mouth daily. 30 Tab 2    OMEGA-3/DHA/EPA/FISH OIL (FISH OIL HIGH POTENCY PO) Take  by mouth.  multivitamin (ONE A DAY) tablet Take 1 Tab by mouth daily.  divalproex DR (DEPAKOTE) 500 mg tablet Take 500 mg by mouth three (3) times daily. Takes ER and takes 1500 mg once a day      clonazePAM (KLONOPIN) 0.5 mg tablet Take 0.5 mg by mouth two (2) times a day.  FLUoxetine (PROZAC) 40 mg capsule Take 40 mg by mouth daily.  aspirin 81 mg chewable tablet Take 81 mg by mouth daily.  Blood-Glucose Meter monitoring kit Check Glucose once a day for E 11.9 1 Kit 0     Allergies   Allergen Reactions    Venom-Honey Bee Anaphylaxis     Visit Vitals    /86 (BP 1 Location: Right arm, BP Patient Position: Sitting)    Pulse 80    Temp 97.3 °F (36.3 °C) (Temporal)    Resp 19    Ht 5' 8\" (1.727 m)    Wt 218 lb (98.9 kg)    SpO2 98%    BMI 33.15 kg/m2       Physical Exam   Constitutional: He is oriented to person, place, and time. He appears well-developed and well-nourished. No distress. HENT:   Head: Normocephalic and atraumatic. Right Ear: External ear normal.   Left Ear: External ear normal.   Nose: Nose normal.   Mouth/Throat: No oropharyngeal exudate. Mildly pink pharynx   Eyes: Conjunctivae and EOM are normal. Pupils are equal, round, and reactive to light. Neck:   4 cm lump on ant neck above the thyroid, smooth, overlying skin with mild abrasion from shaving   Pulmonary/Chest: Effort normal.   Lymphadenopathy:     He has no cervical adenopathy. Neurological: He is alert and oriented to person, place, and time. Skin: Skin is warm and dry. No erythema. Psychiatric: He has a normal mood and affect. Nursing note and vitals reviewed. Recent Results (from the past 12 hour(s))   AMB POC RAPID STREP A    Collection Time: 04/07/17  4:17 PM   Result Value Ref Range    VALID INTERNAL CONTROL POC Yes     Group A Strep Ag Negative Negative       ASSESSMENT and PLAN    ICD-10-CM ICD-9-CM    1. Lump on neck R22.1 784.2 amoxicillin-clavulanate (AUGMENTIN) 875-125 mg per tablet      US HEAD NECK SOFT TISSUE   2.  Sore throat J02.9 462 AMB POC RAPID STREP A   go to ER over the weekend if getting worse

## 2017-04-17 ENCOUNTER — TELEPHONE (OUTPATIENT)
Dept: FAMILY MEDICINE CLINIC | Age: 52
End: 2017-04-17

## 2017-04-17 ENCOUNTER — CLINICAL SUPPORT (OUTPATIENT)
Dept: FAMILY MEDICINE CLINIC | Age: 52
End: 2017-04-17

## 2017-04-17 DIAGNOSIS — E11.9 TYPE 2 DIABETES MELLITUS WITHOUT COMPLICATION, WITHOUT LONG-TERM CURRENT USE OF INSULIN (HCC): ICD-10-CM

## 2017-04-17 DIAGNOSIS — E78.00 HYPERCHOLESTEROLEMIA: ICD-10-CM

## 2017-04-17 DIAGNOSIS — L98.9 SKIN LESION: ICD-10-CM

## 2017-04-17 NOTE — PROGRESS NOTES
Patient in office for lab work only. Venipuncture (R) AC, patient tolerated procedure well.   Racehl Linder RN

## 2017-04-17 NOTE — TELEPHONE ENCOUNTER
Called and spoke to patient's wife. I have advised her that changing the appointment date/time is up to them,  They will need to make sure the office in [place] has access to the records already sent to [facility], if not we will be more then happy to refax them. Also, needs to be sure the Referral # is good for the other doctor too, if one was needed.   Anton Dodson RN

## 2017-04-18 DIAGNOSIS — E78.00 HYPERCHOLESTEROLEMIA: Primary | ICD-10-CM

## 2017-04-18 LAB
ALBUMIN SERPL-MCNC: 4.1 G/DL (ref 3.5–5.5)
ALBUMIN/GLOB SERPL: 1.8 {RATIO} (ref 1.2–2.2)
ALP SERPL-CCNC: 48 IU/L (ref 39–117)
ALT SERPL-CCNC: 66 IU/L (ref 0–44)
AST SERPL-CCNC: 46 IU/L (ref 0–40)
BASOPHILS # BLD AUTO: 0 X10E3/UL (ref 0–0.2)
BASOPHILS NFR BLD AUTO: 0 %
BILIRUB SERPL-MCNC: 0.4 MG/DL (ref 0–1.2)
BUN SERPL-MCNC: 16 MG/DL (ref 6–24)
BUN/CREAT SERPL: 17 (ref 9–20)
CALCIUM SERPL-MCNC: 9.4 MG/DL (ref 8.7–10.2)
CHLORIDE SERPL-SCNC: 96 MMOL/L (ref 96–106)
CHOLEST SERPL-MCNC: 148 MG/DL (ref 100–199)
CO2 SERPL-SCNC: 25 MMOL/L (ref 18–29)
CREAT SERPL-MCNC: 0.92 MG/DL (ref 0.76–1.27)
EOSINOPHIL # BLD AUTO: 0.2 X10E3/UL (ref 0–0.4)
EOSINOPHIL NFR BLD AUTO: 3 %
ERYTHROCYTE [DISTWIDTH] IN BLOOD BY AUTOMATED COUNT: 15.4 % (ref 12.3–15.4)
GLOBULIN SER CALC-MCNC: 2.3 G/DL (ref 1.5–4.5)
GLUCOSE SERPL-MCNC: 106 MG/DL (ref 65–99)
HBA1C MFR BLD: 7 % (ref 4.8–5.6)
HCT VFR BLD AUTO: 40.8 % (ref 37.5–51)
HDLC SERPL-MCNC: 25 MG/DL
HGB BLD-MCNC: 14.2 G/DL (ref 12.6–17.7)
IMM GRANULOCYTES # BLD: 0.1 X10E3/UL (ref 0–0.1)
IMM GRANULOCYTES NFR BLD: 1 %
INTERPRETATION, 910389: NORMAL
LDLC SERPL CALC-MCNC: ABNORMAL MG/DL (ref 0–99)
LDLC/HDLC SERPL: ABNORMAL RATIO UNITS
LYMPHOCYTES # BLD AUTO: 3.1 X10E3/UL (ref 0.7–3.1)
LYMPHOCYTES NFR BLD AUTO: 41 %
MCH RBC QN AUTO: 29.8 PG (ref 26.6–33)
MCHC RBC AUTO-ENTMCNC: 34.8 G/DL (ref 31.5–35.7)
MCV RBC AUTO: 86 FL (ref 79–97)
MONOCYTES # BLD AUTO: 0.4 X10E3/UL (ref 0.1–0.9)
MONOCYTES NFR BLD AUTO: 6 %
NEUTROPHILS # BLD AUTO: 3.9 X10E3/UL (ref 1.4–7)
NEUTROPHILS NFR BLD AUTO: 49 %
PLATELET # BLD AUTO: 199 X10E3/UL (ref 150–379)
POTASSIUM SERPL-SCNC: 4.2 MMOL/L (ref 3.5–5.2)
PROT SERPL-MCNC: 6.4 G/DL (ref 6–8.5)
RBC # BLD AUTO: 4.76 X10E6/UL (ref 4.14–5.8)
SODIUM SERPL-SCNC: 139 MMOL/L (ref 134–144)
TRIGL SERPL-MCNC: 561 MG/DL (ref 0–149)
VLDLC SERPL CALC-MCNC: ABNORMAL MG/DL (ref 5–40)
WBC # BLD AUTO: 7.7 X10E3/UL (ref 3.4–10.8)

## 2017-04-18 RX ORDER — ATORVASTATIN CALCIUM 40 MG/1
40 TABLET, FILM COATED ORAL DAILY
Qty: 30 TAB | Refills: 3 | Status: SHIPPED | OUTPATIENT
Start: 2017-04-18 | End: 2017-08-27 | Stop reason: SDUPTHER

## 2017-04-18 NOTE — PROGRESS NOTES
Call pt, the HbA1C is 7.0, just ok, higher than in the past, cont current med and low conc sweets diet and recheck in 3 mo  The Chol is better but the Triglycerides are very high, I am increasing the Lipitor to 40 mg and recheck in 3 mo and work on diet exercise and weight loss  The kidney tests are normal  The liver tests are elevated, avoid ETOH and Tylenol and please add on a Hepatitis ABC panel and we will recheck in 3 mo  The CBC is normal

## 2017-04-20 LAB
HAV IGM SERPL QL IA: NEGATIVE
HBV CORE IGM SERPL QL IA: NEGATIVE
HBV SURFACE AG SERPL QL IA: NEGATIVE
HCV AB S/CO SERPL IA: <0.1 S/CO RATIO (ref 0–0.9)
SPECIMEN STATUS REPORT, ROLRST: NORMAL

## 2017-05-18 ENCOUNTER — OFFICE VISIT (OUTPATIENT)
Dept: FAMILY MEDICINE CLINIC | Age: 52
End: 2017-05-18

## 2017-05-18 VITALS
OXYGEN SATURATION: 98 % | TEMPERATURE: 95.6 F | BODY MASS INDEX: 33.49 KG/M2 | DIASTOLIC BLOOD PRESSURE: 80 MMHG | SYSTOLIC BLOOD PRESSURE: 110 MMHG | RESPIRATION RATE: 16 BRPM | HEIGHT: 68 IN | HEART RATE: 95 BPM | WEIGHT: 221 LBS

## 2017-05-18 DIAGNOSIS — K21.9 GASTROESOPHAGEAL REFLUX DISEASE WITHOUT ESOPHAGITIS: ICD-10-CM

## 2017-05-18 DIAGNOSIS — Z23 ENCOUNTER FOR IMMUNIZATION: Primary | ICD-10-CM

## 2017-05-18 DIAGNOSIS — Z78.9 ENGAGES IN TRAVEL ABROAD: ICD-10-CM

## 2017-05-18 RX ORDER — PHENOL/SODIUM PHENOLATE
20 AEROSOL, SPRAY (ML) MUCOUS MEMBRANE DAILY
Qty: 30 TAB | Refills: 2 | Status: SHIPPED | OUTPATIENT
Start: 2017-05-18 | End: 2017-08-28 | Stop reason: SDUPTHER

## 2017-05-18 RX ORDER — DIVALPROEX SODIUM 500 MG/1
500 TABLET, EXTENDED RELEASE ORAL
COMMUNITY
End: 2017-06-29

## 2017-05-18 RX ORDER — GABAPENTIN 300 MG/1
300 CAPSULE ORAL 3 TIMES DAILY
COMMUNITY
End: 2017-07-19 | Stop reason: SDUPTHER

## 2017-05-18 RX ORDER — BUPROPION HYDROCHLORIDE 150 MG/1
150 TABLET ORAL
COMMUNITY
End: 2017-06-29

## 2017-05-18 NOTE — MR AVS SNAPSHOT
Visit Information Date & Time Provider Department Dept. Phone Encounter #  
 5/18/2017  2:00 PM Matteo Aguero PA-C [address] [id number] Upcoming Health Maintenance Date Due  
 FOOT EXAM Q1 7/14/2017 MICROALBUMIN Q1 7/14/2017 INFLUENZA AGE 9 TO ADULT 8/1/2017 FOBT Q 1 YEAR AGE 50-75 8/26/2017 EYE EXAM RETINAL OR DILATED Q1 10/4/2017 HEMOGLOBIN A1C Q6M 10/17/2017 LIPID PANEL Q1 4/17/2018 DTaP/Tdap/Td series (2 - Td) 12/27/2026 Allergies as of 5/18/2017  Review Complete On: 5/18/2017 By: Matteo Aguero PA-C Severity Noted Reaction Type Reactions Venom-honey Bee High 07/14/2016    Anaphylaxis Current Immunizations  Reviewed on 5/18/2017 Name Date Influenza High Dose Vaccine PF 9/14/2015 Tdap 5/18/2017 Reviewed by Reynold Dalton LPN on 1/14/6303 at  2:40 PM  
You Were Diagnosed With   
  
 Codes Comments Encounter for immunization    -  Primary ICD-10-CM: C76 ICD-9-CM: V03.89 Engages in travel abroad     ICD-10-CM: Z78.9 ICD-9-CM: V49.89 Gastroesophageal reflux disease without esophagitis     ICD-10-CM: K21.9 ICD-9-CM: 530.81 Vitals BP Pulse Temp Resp Height(growth percentile) Weight(growth percentile) 110/80 (BP 1 Location: Right arm, BP Patient Position: Sitting) 95 95.6 °F (35.3 °C) (Oral) 16 5' 8\" (1.727 m) 221 lb (100.2 kg) SpO2 BMI Smoking Status 98% 33.6 kg/m2 Former Smoker Vitals History BMI and BSA Data Body Mass Index Body Surface Area  
 33.6 kg/m 2 2.19 m 2 Preferred Pharmacy Pharmacy Name Phone [facility] [address] [phone] Your Updated Medication List  
  
   
This list is accurate as of: 5/18/17  2:51 PM.  Always use your most recent med list.  
  
  
  
  
 ABILIFY 15 mg tablet Generic drug:  ARIPiprazole Take 15 mg by mouth daily. aspirin 81 mg chewable tablet Take 81 mg by mouth daily. atorvastatin 40 mg tablet Commonly known as:  LIPITOR Take 1 Tab by mouth daily. Blood-Glucose Meter monitoring kit Check Glucose once a day for E 11.9 buPROPion  mg tablet Commonly known as:  Mikel Mini Take 150 mg by mouth every morning. clonazePAM 0.5 mg tablet Commonly known as:  Cristal Medici Take 0.5 mg by mouth two (2) times a day. DEPAKOTE  mg ER tablet Generic drug:  divalproex ER Take 500 mg by mouth. Takes 3 @@ hs. EPINEPHrine 0.3 mg/0.3 mL injection Commonly known as:  EPIPEN  
0.3 mL by IntraMUSCular route once as needed for up to 1 dose. FISH OIL HIGH POTENCY PO Take  by mouth. FLUoxetine 40 mg capsule Commonly known as:  PROzac Take 40 mg by mouth daily. gabapentin 300 mg capsule Commonly known as:  NEURONTIN Take 300 mg by mouth three (3) times daily. metFORMIN  mg tablet Commonly known as:  GLUCOPHAGE XR Take 1 Tab by mouth daily (with breakfast). multivitamin tablet Commonly known as:  ONE A DAY Take 1 Tab by mouth daily. Omeprazole delayed release 20 mg tablet Commonly known as:  PRILOSEC D/R Take 1 Tab by mouth daily. Prescriptions Sent to Pharmacy Refills Omeprazole delayed release (PRILOSEC D/R) 20 mg tablet 2 Sig: Take 1 Tab by mouth daily. Class: Normal  
 Pharmacy: [facility] [address] [address] [address] Ph #: [phone] Route: Oral  
  
We Performed the Following TETANUS, DIPHTHERIA TOXOIDS AND ACELLULAR PERTUSSIS VACCINE (TDAP), IN INDIVIDS. >=7, IM [id number] CPT(R)] Introducing [facility] & HEALTH SERVICES! Dear Anaid Pimentel: Thank you for requesting a [employer] account. Our records indicate that you already have an active [employer] account. You can access your account anytime at [email or web address] Did you know that you can access your hospital and ER discharge instructions at any time in [employer]? You can also review all of your test results from your hospital stay or ER visit. Additional Information If you have questions, please visit the Frequently Asked Questions section of the [employer] website at [email or web address] Remember, [employer] is NOT to be used for urgent needs. For medical emergencies, dial 911. Now available from your iPhone and Android! Please provide this summary of care documentation to your next provider. Your primary care clinician is listed as Edvin Jones. If you have any questions after today's visit, please call [phone].

## 2017-05-18 NOTE — PATIENT INSTRUCTIONS
Hepatitis A Vaccine: What You Need to Know  Why get vaccinated? Hepatitis A is a serious liver disease. It is caused by the hepatitis A virus (HAV). HAV is spread from person to person through contact with the feces (stool) of people who are infected, which can easily happen if someone does not wash his or her hands properly. You can also get hepatitis A from food, water, or objects contaminated with HAV. Symptoms of hepatitis A can include:  · Fever, fatigue, loss of appetite, nausea, vomiting, and/or joint pain. · Severe stomach pains and diarrhea (mainly in children). · Jaundice (yellow skin or eyes, dark urine, jose francisco-colored bowel movements). These symptoms usually appear 2 to 6 weeks after exposure and usually last less than 2 months, although some people can be ill for as long as 6 months. If you have hepatitis A, you may be too ill to work. Children often do not have symptoms, but most adults do. You can spread HAV without having symptoms. Hepatitis A can cause liver failure and death, although this is rare and occurs more commonly in persons 48years of age or older and persons with other liver diseases, such as hepatitis B or C. Hepatitis A vaccine can prevent hepatitis A. Hepatitis A vaccines were recommended in the [facility] beginning in 1996. Since then, the number of cases reported each year in the [place] has dropped from around 31,000 cases to fewer than 1,500 cases. Hepatitis A vaccine  Hepatitis A vaccine is an inactivated (killed) vaccine. You will need 2 doses for long-lasting protection. These doses should be given at least 6 months apart. Children are routinely vaccinated between their first and second birthdays (15 through 22 months of age). Older children and adolescents can get the vaccine after 23 months. Adults who have not been vaccinated previously and want to be protected against hepatitis A can also get the vaccine.   You should get hepatitis A vaccine if you:  · Are traveling to countries where hepatitis A is common. · Are a man who has sex with other men. · Use illegal drugs. · Have a chronic liver disease such as hepatitis B or hepatitis C.  · Are being treated with clotting-factor concentrates. · Work with hepatitis A-infected animals or in a hepatitis A research laboratory. · Expect to have close personal contact with an international adoptee from a country where hepatitis A is common. Ask your healthcare provider if you want more information about any of these groups. There are no known risks to getting hepatitis A vaccine at the same time as other vaccines. Some people should not get this vaccine  Tell the person who is giving you the vaccine:  · If you have any severe, life-threatening allergies. If you ever had a life-threatening allergic reaction after a dose of hepatitis A vaccine, or have a severe allergy to any part of this vaccine, you may be advised not to get vaccinated. Ask your health care provider if you want information about vaccine components. · If you are not feeling well. If you have a mild illness, such as a cold, you can probably get the vaccine today. If you are moderately or severely ill, you should probably wait until you recover. Your doctor can advise you. Risks of a vaccine reaction  With any medicine, including vaccines, there is a chance of side effects. These are usually mild and go away on their own, but serious reactions are also possible. Most people who get hepatitis A vaccine do not have any problems with it. Minor problems following hepatitis A vaccine include:  · Soreness or redness where the shot was given  · Low-grade fever  · Headache  · Tiredness  If these problems occur, they usually begin soon after the shot and last 1 or 2 days. Your doctor can tell you more about these reactions. Other problems that could happen after this vaccine:  · People sometimes faint after a medical procedure, including vaccination. Sitting or lying down for about 15 minutes can help prevent fainting, and injuries caused by a fall. Tell your provider if you feel dizzy, or have vision changes or ringing in the ears. · Some people get shoulder pain that can be more severe and longer lasting than the more routine soreness that can follow injections. This happens very rarely. · Any medication can cause a severe allergic reaction. Such reactions from a vaccine are very rare, estimated at about 1 in a million doses, and would happen within a few minutes to a few hours after the vaccination. As with any medicine, there is a very remote chance of a vaccine causing a serious injury or death. The safety of vaccines is always being monitored. For more information, visit: www.cdc.gov/vaccinesafety. What if there is a serious problem? What should I look for? · Look for anything that concerns you, such as signs of a severe allergic reaction, very high fever, or unusual behavior. Signs of a severe allergic reaction can include hives, swelling of the face and throat, difficulty breathing, a fast heartbeat, dizziness, and weakness. These would usually start a few minutes to a few hours after the vaccination. What should I do? · If you think it is a severe allergic reaction or other emergency that can't wait, call call 911and get to the nearest hospital. Otherwise, call your clinic. · Afterward, the reaction should be reported to the Vaccine Adverse Event Reporting System (VAERS). Your doctor should file this report, or you can do it yourself through the VAERS web site at www.vaers. hhs.gov, or by calling 2-703.219.9082. VAERS does not give medical advice. The National Vaccine Injury Compensation Program  The National Vaccine Injury Compensation Program (VICP) is a federal program that was created to compensate people who may have been injured by certain vaccines.   Persons who believe they may have been injured by a vaccine can learn about the program and about filing a claim by calling 0-676.304.1283 or visiting the StatSheet website at www.[facility].gov/vaccinecompensation. There is a time limit to file a claim for compensation. How can I learn more? · Ask your healthcare provider. He or she can give you the vaccine package insert or suggest other sources of information. · Call your local or state health department. · Contact the Centers for Disease Control and Prevention (CDC):  ¨ Call 2-981.300.7321 (1-800-CDC-INFO). ¨ Visit CDC's website at www.cdc.gov/vaccines. Vaccine Information Statement  Hepatitis A Vaccine  7/20/2016  42 U. S.C. § 300aa-26  [place] Department of Health and Human Services  Centers for Disease Control and Prevention  Many Vaccine Information Statements are available in [place] and other languages. See www.immunize.org/vis. Hojas de información sobre vacunas están disponibles en español y en otros idiomas. Visite www.immunize.org/vis. Care instructions adapted under license by your healthcare professional. If you have questions about a medical condition or this instruction, always ask your healthcare professional. [place] [address] any warranty or liability for your use of this information. Typhoid Vaccines: What You Need to Know  What is typhoid? Typhoid (typhoid fever) is a serious disease. It is caused by bacteria called Salmonella Typhi. Typhoid causes a high fever, fatigue, weakness, stomach pains, headache, loss of appetite, and sometimes a rash. If it is not treated, it can kill up to 30% of people who get it. Some people who get typhoid become \"carriers\" who can spread the disease to others. Generally, people get typhoid from contaminated food or water. Typhoid is rare in the [place] and most [place] citizens who get the disease get it while traveling. Typhoid strikes about 21 million people a year around the world and kills about 200,000. Typhoid vaccines  Typhoid vaccine can prevent typhoid.   There are two vaccines to prevent typhoid. One is an inactivated (killed) vaccine gotten as a shot. The other is a live, attenuated (weakened) vaccine which is taken orally (by mouth). Who should get typhoid vaccine and when? Routine typhoid vaccination is not recommended in the [place], but typhoid vaccine is recommended for:  · Travelers to parts of the world where typhoid is common. (NOTE: Typhoid vaccine is not 100% effective and is not a substitute for being careful about what you eat or drink.)  · People in close contact with a typhoid carrier. · Laboratory workers who work with Salmonella Typhi bacteria. Inactivated typhoid vaccine (shot)  · One dose provides protection. It should be given at least 2 weeks before travel to allow the vaccine time to work. · A booster dose is needed every 2 years for people who remain at risk. Live typhoid vaccine (oral)  · Four doses: one capsule every other day for a week (day 1, day 3, day 5, and day 7). The last dose should be given at least 1 week before travel to allow the vaccine time to work. · Swallow each dose about an hour before a meal with a cold or lukewarm drink. Do not chew the capsule. · A booster dose is needed every 5 years for people who remain at risk. Either vaccine may safely be given at the same time as other vaccines. Some people should not get typhoid vaccine or should wait  Inactivated typhoid vaccine (shot)  · Should not be given to children younger than 3years of age. · Anyone who has had a severe reaction to a previous dose of this vaccine should not get another dose. · Anyone who has a severe allergy to any component of this vaccine should not get it. Tell your doctor if you have any severe allergies. · Anyone who is moderately or severely ill at the time the shot is scheduled should usually wait until they recover before getting the vaccine.   Live typhoid vaccine (oral)  · Should not be given to children younger than 6 years of age.  · Anyone who has had a severe reaction to a previous dose of this vaccine should not get another dose. · Anyone who has a severe allergy to any component of this vaccine should not get it. Tell your doctor if you have any severe allergies. · Anyone who is moderately or severely ill at the time the vaccine is scheduled should usually wait until they recover before getting it. Tell your doctor if you have an illness involving vomiting or diarrhea. · Anyone whose immune system is weakened should not get this vaccine. They should get the typhoid shot instead. This includes anyone who:  ¨ Has HIV/AIDS or another disease that affects the immune system. ¨ Is being treated with drugs that affect the immune system, such as steroids for 2 weeks or longer. ¨ Has any kind of cancer. ¨ Is taking cancer treatment with radiation or drugs. · Oral typhoid vaccine should not be given until at least 3 days after taking certain antibiotics. Ask your doctor for more information. What are the risks from typhoid vaccine? Like any medicine, a vaccine could cause a serious problem, such as a severe allergic reaction. The risk of typhoid vaccine causing serious harm, or death, is extremely small. Serious problems from either typhoid vaccine are very rare. Inactivated typhoid vaccine (shot) Mild reactions  · Fever (up to about 1 person out of 100)  · Headache (up to about 1 person out of 30)  · Redness or swelling at the site of the injection (up to about 1 person out of 15)  Live typhoid vaccine (oral) Mild reactions  · Fever or headache (up to about 1 person out of 20)  · Stomach pain, nausea, vomiting, rash (rare)  What if there is a serious reaction? What should I look for? · Look for anything that concerns you, such as signs of a severe allergic reaction, very high fever, or behavior changes.   Signs of a severe allergic reaction can include hives, swelling of the face and throat, difficulty breathing, a fast heartbeat, dizziness, and weakness. These would start a few minutes to a few hours after the vaccination. What should I do? · If you think it is a severe allergic reaction or other emergency that can't wait, call 911 or get the person to the nearest hospital. Otherwise, call your doctor. · Afterward, the reaction should be reported to the Vaccine Adverse Event Reporting System (VAERS). Your doctor might file this report, or you can do it yourself through the VAERS website at www.vaers. [facility].gov, or by calling 1-681.152.6253. VAERS is only for reporting reactions. They do not give medical advice. How can I learn more? · Ask your doctor. · Contact the Centers for Disease Control and Prevention (CDC):  ¨ Call 6-674.838.8917 (1-800-CDC-INFO). ¨ Visit the CDC website at www.cdc.gov/vaccines/vpd-vac/typhoid/default.htm. Vaccine Information Statement  Typhoid Vaccine  (5/29/2012)  Department of Health and Human Services  Centers for Disease Control and Prevention  Many Vaccine Information Statements are available in [place] and other languages. See www.immunize.org/vis. Hojas de información sobre vacunas están disponibles en español y en otros idiomas. Visite www.immunize.org/vis. Care instructions adapted under license by your healthcare professional. If you have questions about a medical condition or this instruction, always ask your healthcare professional. [address] any warranty or liability for your use of this information.

## 2017-05-18 NOTE — PROGRESS NOTES
Wendy Watts is a 45 y.o. male who presents to the office today with the following:  Chief Complaint   Patient presents with    Immunization/Injection     update for  travel       HPI  Going to visit father in [facility]. Needs vaccinations. Would like Typhoid, Tdap, and Hep A. Otherwise feeling well with no other complaints or acute concerns. No recent fever or illness. (last ~1 mo ago, sxs fully resolved). Would also like refill of omeprazole for GERD. Has been on for years. Tums do not work. Has not tried Zantac. Does try LMs. Review of Systems   Constitutional: Negative for chills, fever and malaise/fatigue. HENT: Negative for sore throat. Eyes: Negative. Respiratory: Negative for cough and shortness of breath. Cardiovascular: Negative for chest pain and leg swelling. Gastrointestinal: Negative for abdominal pain, diarrhea, nausea and vomiting. Genitourinary: Negative. Musculoskeletal: Positive for back pain (chronic). Skin: Negative for rash. Neurological: Negative. Negative for headaches. See HPI. Allergies   Allergen Reactions    Venom-Honey Bee Anaphylaxis       Current Outpatient Prescriptions   Medication Sig    gabapentin (NEURONTIN) 300 mg capsule Take 300 mg by mouth three (3) times daily.  divalproex ER (DEPAKOTE ER) 500 mg ER tablet Take 500 mg by mouth. Takes 3 @@ hs.  buPROPion XL (WELLBUTRIN XL) 150 mg tablet Take 150 mg by mouth every morning.  Omeprazole delayed release (PRILOSEC D/R) 20 mg tablet Take 1 Tab by mouth daily.  atorvastatin (LIPITOR) 40 mg tablet Take 1 Tab by mouth daily.  metFORMIN ER (GLUCOPHAGE XR) 500 mg tablet Take 1 Tab by mouth daily (with breakfast).  ARIPiprazole (ABILIFY) 15 mg tablet Take 15 mg by mouth daily.  OMEGA-3/DHA/EPA/FISH OIL (FISH OIL HIGH POTENCY PO) Take  by mouth.  multivitamin (ONE A DAY) tablet Take 1 Tab by mouth daily.     clonazePAM (KLONOPIN) 0.5 mg tablet Take 0.5 mg by mouth two (2) times a day.  FLUoxetine (PROZAC) 40 mg capsule Take 40 mg by mouth daily.  aspirin 81 mg chewable tablet Take 81 mg by mouth daily.  Blood-Glucose Meter monitoring kit Check Glucose once a day for E 11.9    EPINEPHrine (EPIPEN) 0.3 mg/0.3 mL injection 0.3 mL by IntraMUSCular route once as needed for up to 1 dose. No current facility-administered medications for this visit.         Past Medical History:   Diagnosis Date    Anxiety disorder     Bipolar 1 disorder ([facility] [address]     Depression     Diabetes (HCC)     GERD (gastroesophageal reflux disease)     Hypercholesterolemia     PTSD (post-traumatic stress disorder)     Spondylolisthesis     had steroid shot    Tardive dyskinesia        Past Surgical History:   Procedure Laterality Date    HX CHOLECYSTECTOMY      HX COLONOSCOPY  09/2016    [id number]    HX ENDOSCOPY  2011    HX OTHER SURGICAL      lump right chest, benign       Social History     Social History    Marital status:      Spouse name: N/A    Number of children: N/A    Years of education: N/A     Social History Main Topics    Smoking status: Former Smoker     Types: Cigarettes     Quit date: 1/1/2005    Smokeless tobacco: Never Used    Alcohol use No    Drug use: No    Sexual activity: Yes     Partners: Female     Birth control/ protection: Pill     Other Topics Concern    None     Social History Narrative       Family History   Problem Relation Age of Onset    Cancer Maternal Grandmother     Stroke Maternal Grandfather     Cancer Mother      colon cancer    Glaucoma Father     Hypertension Paternal Grandmother     Glaucoma Paternal Grandmother     Heart Disease Paternal Grandfather          Physical Exam:  Visit Vitals    /80 (BP 1 Location: Right arm, BP Patient Position: Sitting)    Pulse 95    Temp 95.6 °F (35.3 °C) (Oral)    Resp 16    Ht 5' 8\" (1.727 m)    Wt 221 lb (100.2 kg)    SpO2 98%    BMI 33.6 kg/m2     Physical Exam   Constitutional: He is oriented to person, place, and time and well-developed, well-nourished, and in no distress. HENT:   Head: Normocephalic and atraumatic. Right Ear: External ear normal.   Left Ear: External ear normal.   Nose: Nose normal.   Mouth/Throat: Oropharynx is clear and moist.   Eyes: Conjunctivae and EOM are normal.   Neck: Normal range of motion. Neck supple. Cardiovascular: Normal rate, regular rhythm, normal heart sounds and intact distal pulses. Pulmonary/Chest: Effort normal and breath sounds normal.   Abdominal: Soft. He exhibits no distension. There is no tenderness. There is no rebound and no guarding. Musculoskeletal: He exhibits no edema. Lymphadenopathy:     He has no cervical adenopathy. Neurological: He is alert and oriented to person, place, and time. Gait normal.   Skin: Skin is warm and dry. Psychiatric: Mood and affect normal.   Nursing note and vitals reviewed. Assessment/Plan:    ICD-10-CM ICD-9-CM    1. Encounter for immunization Z23 V03.89 TETANUS, DIPHTHERIA TOXOIDS AND ACELLULAR PERTUSSIS VACCINE (TDAP), IN INDIVIDS. >=7, IM   2. Engages in travel abroad Z78.9 V49.89 TETANUS, DIPHTHERIA TOXOIDS AND ACELLULAR PERTUSSIS VACCINE (TDAP), IN INDIVIDS. >=7, IM   3. Gastroesophageal reflux disease without esophagitis K21.9 530.81 Omeprazole delayed release (PRILOSEC D/R) 20 mg tablet  - Counseled on risks and AEs of prolonged use of this medication and to consider RvsB. Rec trying Zantac and reiterated LMs to try. Pt agrees and will try other remedies to limit use of PPI. Rx written for Typhoid and Hep A vaccines. Will get at local pharmacy.     Nita Matias PA-C

## 2017-05-22 ENCOUNTER — TELEPHONE (OUTPATIENT)
Dept: FAMILY MEDICINE CLINIC | Age: 52
End: 2017-05-22

## 2017-05-22 NOTE — TELEPHONE ENCOUNTER
Spoke to wife and she is wanting us to do a referral for her  to neurology,because he never kept his appointment last Oct.Nurse advised that she could call and reschedule that appointment,let us know if she has any problems and we would have to do the referral again.

## 2017-06-01 PROBLEM — M43.10 SPONDYLISTHESIS: Status: ACTIVE | Noted: 2017-06-01

## 2017-06-01 PROBLEM — F44.0 DISSOCIATIVE AMNESIA (HCC): Status: ACTIVE | Noted: 2017-06-01

## 2017-06-01 PROBLEM — F41.9 ANXIETY DISORDER: Status: ACTIVE | Noted: 2017-06-01

## 2017-06-01 PROBLEM — F31.75 BIPOLAR I DISORDER, CURRENT OR MOST RECENT EPISODE DEPRESSED, IN PARTIAL REMISSION (HCC): Status: ACTIVE | Noted: 2017-06-01

## 2017-06-01 PROBLEM — F41.0 PANIC DISORDER WITHOUT AGORAPHOBIA: Status: ACTIVE | Noted: 2017-06-01

## 2017-06-01 PROBLEM — T43.505A NEUROLEPTIC-INDUCED TARDIVE DYSKINESIA: Status: ACTIVE | Noted: 2017-06-01

## 2017-06-01 PROBLEM — G24.01 NEUROLEPTIC-INDUCED TARDIVE DYSKINESIA: Status: ACTIVE | Noted: 2017-06-01

## 2017-07-05 ENCOUNTER — OFFICE VISIT (OUTPATIENT)
Dept: FAMILY MEDICINE CLINIC | Age: 52
End: 2017-07-05

## 2017-07-05 VITALS
WEIGHT: 221.2 LBS | TEMPERATURE: 96.2 F | RESPIRATION RATE: 18 BRPM | HEART RATE: 85 BPM | DIASTOLIC BLOOD PRESSURE: 79 MMHG | BODY MASS INDEX: 33.63 KG/M2 | SYSTOLIC BLOOD PRESSURE: 118 MMHG | OXYGEN SATURATION: 97 %

## 2017-07-05 DIAGNOSIS — E78.00 HYPERCHOLESTEROLEMIA: ICD-10-CM

## 2017-07-05 DIAGNOSIS — E11.9 TYPE 2 DIABETES MELLITUS WITHOUT COMPLICATION, WITHOUT LONG-TERM CURRENT USE OF INSULIN (HCC): Primary | ICD-10-CM

## 2017-07-05 RX ORDER — METFORMIN HYDROCHLORIDE 500 MG/1
500 TABLET, EXTENDED RELEASE ORAL
Qty: 90 TAB | Refills: 1 | Status: SHIPPED | OUTPATIENT
Start: 2017-07-05 | End: 2017-11-17 | Stop reason: SDUPTHER

## 2017-07-05 NOTE — PROGRESS NOTES
HISTORY OF PRESENT ILLNESS  Gal Alonso is a 45 y.o. male. Chief Complaint   Patient presents with    Follow-up     labs & refill       HPI  Going out of country in [place]  Needs med list    Hyperlipidemia  Eating more veggies and less meat  Not fasting today      Also needs HbA1C  Needs Rx of Metformin  No SE  Due for BW in 2 w    Taking Neurontin for Bipolar disorder and back pain    Has PT tomorrow    Review of Systems   Constitutional: Negative for fever and weight loss. HENT: Negative for congestion. Eyes: Negative for blurred vision. Respiratory: Negative for cough. Cardiovascular: Negative for chest pain. Genitourinary: Negative for frequency. Neurological: Negative for sensory change. Endo/Heme/Allergies: Positive for polydipsia. Past Medical History:   Diagnosis Date    Anxiety disorder     Apnea 06/2017    Bipolar 1 disorder (HCC)     Depression     Diabetes (HCC)     GERD (gastroesophageal reflux disease)     Hypercholesterolemia     PTSD (post-traumatic stress disorder)     Spondylolisthesis     had steroid shot    Tardive dyskinesia      Current Outpatient Prescriptions   Medication Sig Dispense Refill    metFORMIN ER (GLUCOPHAGE XR) 500 mg tablet Take 1 Tab by mouth daily (with breakfast). 90 Tab 1    ARIPiprazole (ABILIFY) 15 mg tablet Take 1 Tab by mouth daily. 30 Tab 3    buPROPion XL (WELLBUTRIN XL) 300 mg XL tablet Take 1 Tab by mouth every morning. 30 Tab 2    divalproex ER (DEPAKOTE ER) 500 mg ER tablet Take 3 Tabs by mouth daily. Takes 3 @@ hs. 90 Tab 2    clonazePAM (KLONOPIN) 0.5 mg tablet Take 1 Tab by mouth two (2) times a day. Max Daily Amount: 1 mg. May take additional dose for airplane flights. Max daily dose 1.5 mg 66 Tab 3    gabapentin (NEURONTIN) 300 mg capsule Take 300 mg by mouth three (3) times daily.  Omeprazole delayed release (PRILOSEC D/R) 20 mg tablet Take 1 Tab by mouth daily.  30 Tab 2    atorvastatin (LIPITOR) 40 mg tablet Take 1 Tab by mouth daily. 30 Tab 3    EPINEPHrine (EPIPEN) 0.3 mg/0.3 mL injection 0.3 mL by IntraMUSCular route once as needed for up to 1 dose. 2 Syringe prn    OMEGA-3/DHA/EPA/FISH OIL (FISH OIL HIGH POTENCY PO) Take  by mouth.  multivitamin (ONE A DAY) tablet Take 1 Tab by mouth daily.  aspirin 81 mg chewable tablet Take 81 mg by mouth daily.  Blood-Glucose Meter monitoring kit Check Glucose once a day for E 11.9 1 Kit 0     Allergies   Allergen Reactions    Venom-Honey Bee Anaphylaxis     Visit Vitals    /79    Pulse 85    Temp 96.2 °F (35.7 °C) (Oral)    Resp 18    Wt 221 lb 3.2 oz (100.3 kg)    SpO2 97%    BMI 33.63 kg/m2     Physical Exam   Constitutional: He is oriented to person, place, and time. He appears well-developed and well-nourished. No distress. HENT:   Head: Normocephalic and atraumatic. Right Ear: External ear normal.   Left Ear: External ear normal.   Mouth/Throat: Oropharynx is clear and moist. No oropharyngeal exudate. Eyes: Conjunctivae and EOM are normal. Pupils are equal, round, and reactive to light. Cardiovascular: Normal rate, regular rhythm, normal heart sounds and intact distal pulses. Pulmonary/Chest: Effort normal and breath sounds normal.   Musculoskeletal: He exhibits no edema. Lymphadenopathy:     He has no cervical adenopathy. Neurological: He is alert and oriented to person, place, and time. Skin: Skin is warm and dry. Psychiatric: He has a normal mood and affect. Nursing note and vitals reviewed. ASSESSMENT and PLAN    ICD-10-CM ICD-9-CM    1. Type 2 diabetes mellitus without complication, without long-term current use of insulin (HCC) E11.9 250.00 HM DIABETES FOOT EXAM      MICROALBUMIN, UR, RAND W/ MICROALBUMIN/CREA RATIO      HEMOGLOBIN A1C W/O EAG      metFORMIN ER (GLUCOPHAGE XR) 500 mg tablet   2.  Hypercholesterolemia A64.07 929.1 METABOLIC PANEL, COMPREHENSIVE      LIPID PANEL WITH LDL/HDL RATIO   RTC in 2 w for BW

## 2017-07-05 NOTE — MR AVS SNAPSHOT
Visit Information Date & Time Provider Department Dept. Phone Encounter #  
 7/5/2017  1:20 PM Richard Cartwright MD [address] [id number] Follow-up Instructions Return in about 4 months (around 11/5/2017). Follow-up and Disposition History Upcoming Health Maintenance Date Due  
 FOOT EXAM Q1 7/14/2017 MICROALBUMIN Q1 7/14/2017 FOBT Q 1 YEAR AGE 50-75 8/26/2017 INFLUENZA AGE 9 TO ADULT 8/1/2017 EYE EXAM RETINAL OR DILATED Q1 10/4/2017 HEMOGLOBIN A1C Q6M 10/17/2017 LIPID PANEL Q1 4/17/2018 DTaP/Tdap/Td series (2 - Td) 5/18/2027 Allergies as of 7/5/2017  Review Complete On: 7/5/2017 By: Aranza Raines LPN Severity Noted Reaction Type Reactions Venom-honey Bee High 07/14/2016    Anaphylaxis Current Immunizations  Reviewed on 5/18/2017 Name Date Influenza High Dose Vaccine PF 9/14/2015 Tdap 5/18/2017 Not reviewed this visit You Were Diagnosed With   
  
 Codes Comments Type 2 diabetes mellitus without complication, without long-term current use of insulin (HCC)    -  Primary ICD-10-CM: E11.9 ICD-9-CM: 250.00 Hypercholesterolemia     ICD-10-CM: E78.00 ICD-9-CM: 272.0 Vitals BP Pulse Temp Resp Weight(growth percentile) SpO2  
 118/79 85 96.2 °F (35.7 °C) (Oral) 18 221 lb 3.2 oz (100.3 kg) 97% BMI Smoking Status 33.63 kg/m2 Former Smoker BMI and BSA Data Body Mass Index Body Surface Area  
 33.63 kg/m 2 2.19 m 2 Preferred Pharmacy Pharmacy Name Phone [facility], [place] [address] [phone] Your Updated Medication List  
  
   
This list is accurate as of: 7/5/17  1:36 PM.  Always use your most recent med list.  
  
  
  
  
 ARIPiprazole 15 mg tablet Commonly known as:  ABILIFY Take 1 Tab by mouth daily. aspirin 81 mg chewable tablet Take 81 mg by mouth daily. atorvastatin 40 mg tablet Commonly known as:  LIPITOR Take 1 Tab by mouth daily. Blood-Glucose Meter monitoring kit Check Glucose once a day for E 11.9 buPROPion  mg XL tablet Commonly known as:  Delicia Buckle Take 1 Tab by mouth every morning. clonazePAM 0.5 mg tablet Commonly known as:  Paula Gan Take 1 Tab by mouth two (2) times a day. Max Daily Amount: 1 mg. May take additional dose for airplane flights. Max daily dose 1.5 mg  
  
 divalproex  mg ER tablet Commonly known as:  DEPAKOTE ER Take 3 Tabs by mouth daily. Takes 3 @@ hs. EPINEPHrine 0.3 mg/0.3 mL injection Commonly known as:  EPIPEN  
0.3 mL by IntraMUSCular route once as needed for up to 1 dose. FISH OIL HIGH POTENCY PO Take  by mouth.  
  
 gabapentin 300 mg capsule Commonly known as:  NEURONTIN Take 300 mg by mouth three (3) times daily. metFORMIN  mg tablet Commonly known as:  GLUCOPHAGE XR Take 1 Tab by mouth daily (with breakfast). multivitamin tablet Commonly known as:  ONE A DAY Take 1 Tab by mouth daily. Omeprazole delayed release 20 mg tablet Commonly known as:  PRILOSEC D/R Take 1 Tab by mouth daily. Prescriptions Sent to Pharmacy Refills  
 metFORMIN ER (GLUCOPHAGE XR) 500 mg tablet 1 Sig: Take 1 Tab by mouth daily (with breakfast). Class: Normal  
 Pharmacy: [address] [place] [address] [address] [address] [address] Ph #: [phone] Route: Oral  
  
We Performed the Following  DIABETES FOOT EXAM [[facility]] MICROALBUMIN, UR, RAND W/ MICROALBUMIN/CREA RATIO [id number] CPT(R)] Follow-up Instructions Return in about 4 months (around 11/5/2017). To-Do List   
 07/05/2017 Lab:  HEMOGLOBIN A1C W/O EAG   
  
 07/05/2017 Lab:  LIPID PANEL WITH LDL/HDL RATIO   
  
 07/05/2017   Lab:  METABOLIC PANEL, COMPREHENSIVE   
  
 07/06/2017 2:30 PM  
 Appointment with Rodger Oseguera PT at [address] [place] [address] ([phone]) Please remember to arrive at the hospital at least 30 minutes prior to your scheduled appointment time. When you come in for your appointment, please be sure to bring the therapy prescription the doctor gave you, your insurance card, and a list of the medicines you are taking. Also, please remember to wear comfortable, loose- fitting clothes. We look forward to seeing you. 07/10/2017 2:00 PM  
  Appointment with Rodger Oseguera PT at [address] [place] [address] ([phone]) Please remember to arrive at the hospital at least 30 minutes prior to your scheduled appointment time. When you come in for your appointment, please be sure to bring the therapy prescription the doctor gave you, your insurance card, and a list of the medicines you are taking. Also, please remember to wear comfortable, loose- fitting clothes. We look forward to seeing you. 07/13/2017 2:00 PM  
  Appointment with Rodger Oseguera PT at [address] [place] [address] ([phone]) Please remember to arrive at the hospital at least 30 minutes prior to your scheduled appointment time. When you come in for your appointment, please be sure to bring the therapy prescription the doctor gave you, your insurance card, and a list of the medicines you are taking. Also, please remember to wear comfortable, loose- fitting clothes. We look forward to seeing you. 07/17/2017 1:30 PM  
  Appointment with Rodger Oseguera PT at [address] [place] [address] ([phone]) Please remember to arrive at the hospital at least 30 minutes prior to your scheduled appointment time. When you come in for your appointment, please be sure to bring the therapy prescription the doctor gave you, your insurance card, and a list of the medicines you are taking. Also, please remember to wear comfortable, loose- fitting clothes. We look forward to seeing you.   
  
 07/20/2017 1:30 PM  
 Appointment with Jarred Cárdenas PT at [address] [place] [address] ([phone]) Please remember to arrive at the hospital at least 30 minutes prior to your scheduled appointment time. When you come in for your appointment, please be sure to bring the therapy prescription the doctor gave you, your insurance card, and a list of the medicines you are taking. Also, please remember to wear comfortable, loose- fitting clothes. We look forward to seeing you. 07/25/2017 1:00 PM  
  Appointment with Jarred Cárdenas PT at [address] [place] [address] ([phone]) Please remember to arrive at the hospital at least 30 minutes prior to your scheduled appointment time. When you come in for your appointment, please be sure to bring the therapy prescription the doctor gave you, your insurance card, and a list of the medicines you are taking. Also, please remember to wear comfortable, loose- fitting clothes. We look forward to seeing you. 07/27/2017 1:00 PM  
  Appointment with Jarred Cárdenas PT at [address] [place] [address] ([phone]) Please remember to arrive at the hospital at least 30 minutes prior to your scheduled appointment time. When you come in for your appointment, please be sure to bring the therapy prescription the doctor gave you, your insurance card, and a list of the medicines you are taking. Also, please remember to wear comfortable, loose- fitting clothes. We look forward to seeing you. 08/29/2017 2:00 PM  
  Appointment with Karmen Peres NP at [address] [place] [address] ([phone]) \[facility] [place] [facility]\"" & HEALTH SERVICES! Dear Ranjit Fischer: Thank you for requesting a [employer] account. Our records indicate that you already have an active [employer] account. You can access your account anytime at [email or web address] Did you know that you can access your hospital and ER discharge instructions at any time in [employer]-NOK? You can also review all of your test results from your hospital stay or ER visit. Additional Information If you have questions, please visit the Frequently Asked Questions section of the [employer] website at [email or web address] Remember, bewarket is NOT to be used for urgent needs. For medical emergencies, dial 911. Now available from your iPhone and Android! Please provide this summary of care documentation to your next provider. Your primary care clinician is listed as Charlotte Patel. If you have any questions after today's visit, please call [phone].

## 2017-07-05 NOTE — PROGRESS NOTES
Chief Complaint   Patient presents with    Follow-up     labs & refill     Health Maintenance Due   Topic Date Due    FOOT EXAM Q1  07/14/2017 today    MICROALBUMIN Q1  07/14/2017 today    FOBT Q 1 YEAR AGE 50-75  08/26/2017     Visit Vitals    /79    Pulse 85    Temp 96.2 °F (35.7 °C) (Oral)    Resp 18    Wt 221 lb 3.2 oz (100.3 kg)    SpO2 97%    BMI 33.63 kg/m2     Michele Best LPN

## 2017-07-06 LAB
ALBUMIN/CREAT UR: 7.8 MG/G CREAT (ref 0–30)
CREAT UR-MCNC: 134.1 MG/DL
MICROALBUMIN UR-MCNC: 10.5 UG/ML

## 2017-07-17 ENCOUNTER — CLINICAL SUPPORT (OUTPATIENT)
Dept: FAMILY MEDICINE CLINIC | Age: 52
End: 2017-07-17

## 2017-07-17 DIAGNOSIS — E78.00 HYPERCHOLESTEROLEMIA: Primary | ICD-10-CM

## 2017-07-17 DIAGNOSIS — E11.9 TYPE 2 DIABETES MELLITUS WITHOUT COMPLICATION, WITHOUT LONG-TERM CURRENT USE OF INSULIN (HCC): ICD-10-CM

## 2017-07-17 DIAGNOSIS — E08.00 DIABETES MELLITUS DUE TO UNDERLYING CONDITION WITH HYPEROSMOLARITY WITHOUT COMA, WITHOUT LONG-TERM CURRENT USE OF INSULIN (HCC): ICD-10-CM

## 2017-07-17 NOTE — PROGRESS NOTES
Patient in office for future labs only, venipuncture (R) AC, patient tolerated procedure well.   Jessica Rodriguez RN

## 2017-07-18 LAB
ALBUMIN SERPL-MCNC: 4.5 G/DL (ref 3.5–5.5)
ALBUMIN/GLOB SERPL: 1.9 {RATIO} (ref 1.2–2.2)
ALP SERPL-CCNC: 55 IU/L (ref 39–117)
ALT SERPL-CCNC: 78 IU/L (ref 0–44)
AST SERPL-CCNC: 55 IU/L (ref 0–40)
BILIRUB SERPL-MCNC: 0.5 MG/DL (ref 0–1.2)
BUN SERPL-MCNC: 15 MG/DL (ref 6–24)
BUN/CREAT SERPL: 15 (ref 9–20)
CALCIUM SERPL-MCNC: 9.8 MG/DL (ref 8.7–10.2)
CHLORIDE SERPL-SCNC: 96 MMOL/L (ref 96–106)
CHOLEST SERPL-MCNC: 158 MG/DL (ref 100–199)
CO2 SERPL-SCNC: 24 MMOL/L (ref 18–29)
CREAT SERPL-MCNC: 0.98 MG/DL (ref 0.76–1.27)
GLOBULIN SER CALC-MCNC: 2.4 G/DL (ref 1.5–4.5)
GLUCOSE SERPL-MCNC: 122 MG/DL (ref 65–99)
HBA1C MFR BLD: 7 % (ref 4.8–5.6)
HDLC SERPL-MCNC: 29 MG/DL
INTERPRETATION, 910389: NORMAL
LDLC SERPL CALC-MCNC: ABNORMAL MG/DL (ref 0–99)
LDLC/HDLC SERPL: ABNORMAL RATIO UNITS
POTASSIUM SERPL-SCNC: 4.6 MMOL/L (ref 3.5–5.2)
PROT SERPL-MCNC: 6.9 G/DL (ref 6–8.5)
SODIUM SERPL-SCNC: 138 MMOL/L (ref 134–144)
TRIGL SERPL-MCNC: 456 MG/DL (ref 0–149)
VLDLC SERPL CALC-MCNC: ABNORMAL MG/DL (ref 5–40)

## 2017-07-18 RX ORDER — FENOFIBRATE 145 MG/1
145 TABLET, COATED ORAL DAILY
Qty: 30 TAB | Refills: 3 | Status: SHIPPED | OUTPATIENT
Start: 2017-07-18 | End: 2017-11-15 | Stop reason: SDUPTHER

## 2017-07-19 NOTE — PROGRESS NOTES
Call pt, the HbA1C is 7.0, cont Metformin  The kidney tests are normal  The liver tests are still up, try to loose some weight  The Chol is ok, but the Triglycerides still high and the good Chol low, increase exercised and  I am adding on a med that will bring down the Triglycerides, but cont Lipitor and avoid Chol rich foods and recheck in 3 mo

## 2017-08-27 DIAGNOSIS — E78.00 HYPERCHOLESTEROLEMIA: ICD-10-CM

## 2017-08-27 RX ORDER — ATORVASTATIN CALCIUM 40 MG/1
TABLET, FILM COATED ORAL
Qty: 30 TAB | Refills: 3 | Status: SHIPPED | OUTPATIENT
Start: 2017-08-27 | End: 2017-11-17 | Stop reason: SDUPTHER

## 2017-08-28 DIAGNOSIS — K21.9 GASTROESOPHAGEAL REFLUX DISEASE WITHOUT ESOPHAGITIS: ICD-10-CM

## 2017-08-28 RX ORDER — OMEPRAZOLE 20 MG/1
CAPSULE, DELAYED RELEASE ORAL
Qty: 30 CAP | Refills: 2 | Status: SHIPPED | OUTPATIENT
Start: 2017-08-28 | End: 2017-11-17 | Stop reason: SDUPTHER

## 2017-11-17 ENCOUNTER — OFFICE VISIT (OUTPATIENT)
Dept: FAMILY MEDICINE CLINIC | Age: 52
End: 2017-11-17

## 2017-11-17 VITALS
DIASTOLIC BLOOD PRESSURE: 73 MMHG | BODY MASS INDEX: 33.72 KG/M2 | OXYGEN SATURATION: 97 % | RESPIRATION RATE: 16 BRPM | HEART RATE: 86 BPM | TEMPERATURE: 96.2 F | WEIGHT: 221.8 LBS | SYSTOLIC BLOOD PRESSURE: 109 MMHG

## 2017-11-17 DIAGNOSIS — E11.9 TYPE 2 DIABETES MELLITUS WITHOUT COMPLICATION, WITHOUT LONG-TERM CURRENT USE OF INSULIN (HCC): Primary | ICD-10-CM

## 2017-11-17 DIAGNOSIS — Z23 ENCOUNTER FOR IMMUNIZATION: ICD-10-CM

## 2017-11-17 DIAGNOSIS — K21.9 GASTROESOPHAGEAL REFLUX DISEASE WITHOUT ESOPHAGITIS: ICD-10-CM

## 2017-11-17 DIAGNOSIS — E78.00 HYPERCHOLESTEROLEMIA: ICD-10-CM

## 2017-11-17 RX ORDER — OMEPRAZOLE 20 MG/1
CAPSULE, DELAYED RELEASE ORAL
Qty: 30 CAP | Refills: 5 | Status: SHIPPED | OUTPATIENT
Start: 2017-11-17 | End: 2018-01-17 | Stop reason: SDUPTHER

## 2017-11-17 RX ORDER — ATORVASTATIN CALCIUM 40 MG/1
TABLET, FILM COATED ORAL
Qty: 30 TAB | Refills: 5 | Status: SHIPPED | OUTPATIENT
Start: 2017-11-17 | End: 2017-11-22 | Stop reason: CLARIF

## 2017-11-17 RX ORDER — CARBIDOPA AND LEVODOPA 25; 100 MG/1; MG/1
1 TABLET ORAL
COMMUNITY
Start: 2017-10-18 | End: 2018-10-18

## 2017-11-17 RX ORDER — FENOFIBRATE 145 MG/1
TABLET, COATED ORAL
Qty: 30 TAB | Refills: 5 | Status: SHIPPED | OUTPATIENT
Start: 2017-11-17 | End: 2018-01-17 | Stop reason: SDUPTHER

## 2017-11-17 RX ORDER — METFORMIN HYDROCHLORIDE 500 MG/1
500 TABLET, EXTENDED RELEASE ORAL
Qty: 30 TAB | Refills: 5 | Status: SHIPPED | OUTPATIENT
Start: 2017-11-17 | End: 2018-01-17 | Stop reason: SDUPTHER

## 2017-11-17 NOTE — PROGRESS NOTES
HISTORY OF PRESENT ILLNESS  Carnell Cabot is a 47 y.o. male. Chief Complaint   Patient presents with    Diabetes     4 mo f/u, DM & hypercholesteremia       HPI  DM   in pm  Lower in am  On Metformin      Hyperlipidemia  Not fasting   Last TG very high  On Tricor and Lipitor 40  No SE    GERD  On Prilosec  Had EGD  And Colonoscopy     No SE with meds    Needs Flu shot    Review of Systems   Constitutional: Negative for weight loss. HENT: Negative for congestion. Eyes: Negative for blurred vision. Respiratory: Negative for cough and shortness of breath. Cardiovascular: Negative for chest pain. Gastrointestinal: Negative for blood in stool. Genitourinary: Negative for frequency and hematuria. Neurological: Negative for dizziness and headaches. Endo/Heme/Allergies: Negative for polydipsia. Past Medical History:   Diagnosis Date    Anxiety disorder     Apnea 06/2017    Bipolar 1 disorder (HCC)     Depression     Diabetes (HCC)     GERD (gastroesophageal reflux disease)     Hypercholesterolemia     Parkinson disease ([facility] 10/2017    PTSD (post-traumatic stress disorder)     Spondylolisthesis     had steroid shot    Tardive dyskinesia      Current Outpatient Prescriptions   Medication Sig Dispense Refill    carbidopa-levodopa (SINEMET)  mg per tablet Take 1 Tab by mouth.  fenofibrate nanocrystallized (TRICOR) 145 mg tablet TAKE ONE TABLET BY MOUTH ONCE DAILY 30 Tab 5    atorvastatin (LIPITOR) 40 mg tablet TAKE ONE TABLET BY MOUTH ONCE DAILY 30 Tab 5    omeprazole (PRILOSEC) 20 mg capsule TAKE ONE CAPSULE BY MOUTH ONCE DAILY 30 Cap 5    metFORMIN ER (GLUCOPHAGE XR) 500 mg tablet Take 1 Tab by mouth daily (with breakfast). 30 Tab 5    ARIPiprazole (ABILIFY) 15 mg tablet Take 1 Tab by mouth daily. 30 Tab 1    FLUoxetine (PROZAC) 40 mg capsule Take 1 Cap by mouth daily. 90 Cap 1    gabapentin (NEURONTIN) 300 mg capsule Take 1 Cap by mouth three (3) times daily.  80 Cap 5    buPROPion XL (WELLBUTRIN XL) 300 mg XL tablet Take 1 Tab by mouth every morning. 30 Tab 2    divalproex ER (DEPAKOTE ER) 500 mg ER tablet Take 3 Tabs by mouth daily. Takes 3 @@ hs. 90 Tab 2    clonazePAM (KLONOPIN) 0.5 mg tablet Take 1 Tab by mouth two (2) times a day. Max Daily Amount: 1 mg. May take additional dose for airplane flights. Max daily dose 1.5 mg 66 Tab 3    EPINEPHrine (EPIPEN) 0.3 mg/0.3 mL injection 0.3 mL by IntraMUSCular route once as needed for up to 1 dose. 2 Syringe prn    OMEGA-3/DHA/EPA/FISH OIL (FISH OIL HIGH POTENCY PO) Take  by mouth.  multivitamin (ONE A DAY) tablet Take 1 Tab by mouth daily.  aspirin 81 mg chewable tablet Take 81 mg by mouth daily.  Blood-Glucose Meter monitoring kit Check Glucose once a day for E 11.9 1 Kit 0     Allergies   Allergen Reactions    Venom-Honey Bee Anaphylaxis     Visit Vitals    /73 (BP 1 Location: Left arm, BP Patient Position: Sitting)    Pulse 86    Temp 96.2 °F (35.7 °C) (Oral)    Resp 16    Wt 221 lb 12.8 oz (100.6 kg)    SpO2 97%    BMI 33.72 kg/m2     Physical Exam   Constitutional: He is oriented to person, place, and time. He appears well-developed and well-nourished. No distress. HENT:   Head: Normocephalic and atraumatic. Right Ear: External ear normal.   Left Ear: External ear normal.   Mouth/Throat: Oropharynx is clear and moist. No oropharyngeal exudate. Eyes: Conjunctivae and EOM are normal. Pupils are equal, round, and reactive to light. Cardiovascular: Normal rate, regular rhythm and normal heart sounds. Pulmonary/Chest: Effort normal and breath sounds normal.   Musculoskeletal: He exhibits no edema. Lymphadenopathy:     He has no cervical adenopathy. Neurological: He is alert and oriented to person, place, and time. Skin: Skin is warm and dry. Psychiatric: He has a normal mood and affect. Nursing note and vitals reviewed. ASSESSMENT and PLAN    ICD-10-CM ICD-9-CM    1. Type 2 diabetes mellitus without complication, without long-term current use of insulin (HCC) E11.9 250.00 HEMOGLOBIN A1C W/O EAG      metFORMIN ER (GLUCOPHAGE XR) 500 mg tablet      CANCELED: HEMOGLOBIN A1C W/O EAG   2. Hypercholesterolemia E78.00 272.0 LIPID PANEL WITH LDL/HDL RATIO      METABOLIC PANEL, COMPREHENSIVE      fenofibrate nanocrystallized (TRICOR) 145 mg tablet      atorvastatin (LIPITOR) 40 mg tablet      CANCELED: LIPID PANEL WITH LDL/HDL RATIO      CANCELED: METABOLIC PANEL, COMPREHENSIVE   3. Gastroesophageal reflux disease without esophagitis K21.9 530.81 omeprazole (PRILOSEC) 20 mg capsule   4.  Encounter for immunization Z23 V03.89 INFLUENZA VIRUS VAC QUAD,SPLIT,PRESV FREE SYRINGE IM

## 2017-11-17 NOTE — MR AVS SNAPSHOT
Visit Information Date & Time Provider Department Dept. Phone Encounter #  
 11/17/2017  3:00 PM Isabel Sommers MD [address] [place] [address] [phone] [id number] Upcoming Health Maintenance Date Due Influenza Age 5 to Adult 8/1/2017 HEMOGLOBIN A1C Q6M 1/17/2018 FOOT EXAM Q1 7/5/2018 MICROALBUMIN Q1 7/5/2018 LIPID PANEL Q1 7/17/2018 EYE EXAM RETINAL OR DILATED Q1 9/12/2018 COLONOSCOPY 10/10/2021 DTaP/Tdap/Td series (2 - Td) 5/18/2027 Allergies as of 11/17/2017  Review Complete On: 9/15/2017 By: John Orlando Severity Noted Reaction Type Reactions Venom-honey Bee High 07/14/2016    Anaphylaxis Current Immunizations  Reviewed on 5/18/2017 Name Date Influenza High Dose Vaccine PF 9/14/2015 Influenza Vaccine (Quad) PF  Incomplete Tdap 5/18/2017 Not reviewed this visit You Were Diagnosed With   
  
 Codes Comments Type 2 diabetes mellitus without complication, without long-term current use of insulin (HCC)    -  Primary ICD-10-CM: E11.9 ICD-9-CM: 250.00 Hypercholesterolemia     ICD-10-CM: E78.00 ICD-9-CM: 272.0 Gastroesophageal reflux disease without esophagitis     ICD-10-CM: K21.9 ICD-9-CM: 530.81 Encounter for immunization     ICD-10-CM: A30 ICD-9-CM: V03.89 Vitals BP Pulse Temp Resp Weight(growth percentile) SpO2  
 109/73 (BP 1 Location: Left arm, BP Patient Position: Sitting) 86 96.2 °F (35.7 °C) (Oral) 16 221 lb 12.8 oz (100.6 kg) 97% BMI Smoking Status 33.72 kg/m2 Former Smoker BMI and BSA Data Body Mass Index Body Surface Area 33.72 kg/m 2 2.2 m 2 Preferred Pharmacy Pharmacy Name Phone [facility] [place] [address] [place] [address] [phone] Your Updated Medication List  
  
   
This list is accurate as of: 11/17/17  3:27 PM.  Always use your most recent med list.  
  
  
  
  
 ARIPiprazole 15 mg tablet Commonly known as:  ABILIFY Take 1 Tab by mouth daily. aspirin 81 mg chewable tablet Take 81 mg by mouth daily. atorvastatin 40 mg tablet Commonly known as:  LIPITOR  
TAKE ONE TABLET BY MOUTH ONCE DAILY Blood-Glucose Meter monitoring kit Check Glucose once a day for E 11.9 buPROPion  mg XL tablet Commonly known as:  Leif Bush Take 1 Tab by mouth every morning. carbidopa-levodopa  mg per tablet Commonly known as:  SINEMET Take 1 Tab by mouth.  
  
 clonazePAM 0.5 mg tablet Commonly known as:  [place] Take 1 Tab by mouth two (2) times a day. Max Daily Amount: 1 mg. May take additional dose for airplane flights. Max daily dose 1.5 mg  
  
 divalproex  mg ER tablet Commonly known as:  DEPAKOTE ER Take 3 Tabs by mouth daily. Takes 3 @@ hs. EPINEPHrine 0.3 mg/0.3 mL injection Commonly known as:  EPIPEN  
0.3 mL by IntraMUSCular route once as needed for up to 1 dose. fenofibrate nanocrystallized 145 mg tablet Commonly known as:  TRICOR  
TAKE ONE TABLET BY MOUTH ONCE DAILY FISH OIL HIGH POTENCY PO Take  by mouth. FLUoxetine 40 mg capsule Commonly known as:  PROzac Take 1 Cap by mouth daily. gabapentin 300 mg capsule Commonly known as:  NEURONTIN Take 1 Cap by mouth three (3) times daily. metFORMIN  mg tablet Commonly known as:  GLUCOPHAGE XR Take 1 Tab by mouth daily (with breakfast). multivitamin tablet Commonly known as:  ONE A DAY Take 1 Tab by mouth daily. omeprazole 20 mg capsule Commonly known as:  PRILOSEC  
TAKE ONE CAPSULE BY MOUTH ONCE DAILY Prescriptions Sent to Pharmacy Refills  
 fenofibrate nanocrystallized (TRICOR) 145 mg tablet 5 Sig: TAKE ONE TABLET BY MOUTH ONCE DAILY Class: Normal  
 Pharmacy: [address] Ph #: [phone] atorvastatin (LIPITOR) 40 mg tablet 5 Sig: TAKE ONE TABLET BY MOUTH ONCE DAILY Class: Normal  
 Pharmacy: [address] Ph #: [phone]  
 omeprazole (PRILOSEC) 20 mg capsule 5 Sig: TAKE ONE CAPSULE BY MOUTH ONCE DAILY Class: Normal  
 Pharmacy: [address] [facility], [place] [address] Ph #: [phone]  
 metFORMIN ER (GLUCOPHAGE XR) 500 mg tablet 5 Sig: Take 1 Tab by mouth daily (with breakfast). Class: Normal  
 Pharmacy: [address] [facility], [facility] [address] Ph #: [phone] Route: Oral  
  
We Performed the Following INFLUENZA VIRUS VAC QUAD,SPLIT,PRESV FREE SYRINGE IM [id number] CPT(R)] To-Do List   
 11/17/2017 Lab:  HEMOGLOBIN A1C W/O EAG   
  
 11/17/2017 Lab:  LIPID PANEL WITH LDL/HDL RATIO   
  
 11/17/2017 Lab:  METABOLIC PANEL, COMPREHENSIVE [facility] SERVICES! Dear Catrina Hoffmann: Thank you for requesting a [employer] account. Our records indicate that you already have an active [employer] account. You can access your account anytime at [email or web address] Did you know that you can access your hospital and ER discharge instructions at any time in [employer]? You can also review all of your test results from your hospital stay or ER visit. Additional Information If you have questions, please visit the Frequently Asked Questions section of the [employer] website at [email or web address] Remember, [employer] is NOT to be used for urgent needs. For medical emergencies, dial 911. Now available from your iPhone and Android! Please provide this summary of care documentation to your next provider. Your primary care clinician is listed as Isa Arreola. If you have any questions after today's visit, please call [phone].

## 2017-11-17 NOTE — PROGRESS NOTES
Chief Complaint   Patient presents with    Diabetes     4 mo f/u, DM & hypercholesteremia     Visit Vitals    /73 (BP 1 Location: Left arm, BP Patient Position: Sitting)    Pulse 86    Temp 96.2 °F (35.7 °C) (Oral)    Resp 16    Wt 221 lb 12.8 oz (100.6 kg)    SpO2 97%    BMI 33.72 kg/m2     Quinn Johnson LPN

## 2017-11-20 ENCOUNTER — CLINICAL SUPPORT (OUTPATIENT)
Dept: FAMILY MEDICINE CLINIC | Age: 52
End: 2017-11-20

## 2017-11-20 DIAGNOSIS — E11.9 TYPE 2 DIABETES MELLITUS WITHOUT COMPLICATION, WITHOUT LONG-TERM CURRENT USE OF INSULIN (HCC): Primary | ICD-10-CM

## 2017-11-21 LAB
ALBUMIN SERPL-MCNC: 4.5 G/DL (ref 3.5–5.5)
ALBUMIN/GLOB SERPL: 2 {RATIO} (ref 1.2–2.2)
ALP SERPL-CCNC: 48 IU/L (ref 39–117)
ALT SERPL-CCNC: 50 IU/L (ref 0–44)
AST SERPL-CCNC: 29 IU/L (ref 0–40)
BILIRUB SERPL-MCNC: 0.2 MG/DL (ref 0–1.2)
BUN SERPL-MCNC: 15 MG/DL (ref 6–24)
BUN/CREAT SERPL: 14 (ref 9–20)
CALCIUM SERPL-MCNC: 9.7 MG/DL (ref 8.7–10.2)
CHLORIDE SERPL-SCNC: 95 MMOL/L (ref 96–106)
CHOLEST SERPL-MCNC: 201 MG/DL (ref 100–199)
CO2 SERPL-SCNC: 27 MMOL/L (ref 18–29)
CREAT SERPL-MCNC: 1.09 MG/DL (ref 0.76–1.27)
GFR SERPLBLD CREATININE-BSD FMLA CKD-EPI: 77 ML/MIN/1.73
GFR SERPLBLD CREATININE-BSD FMLA CKD-EPI: 89 ML/MIN/1.73
GLOBULIN SER CALC-MCNC: 2.2 G/DL (ref 1.5–4.5)
GLUCOSE SERPL-MCNC: 215 MG/DL (ref 65–99)
HBA1C MFR BLD: 7.4 % (ref 4.8–5.6)
HDLC SERPL-MCNC: 18 MG/DL
INTERPRETATION, 910389: NORMAL
LDLC SERPL CALC-MCNC: ABNORMAL MG/DL (ref 0–99)
LDLC/HDLC SERPL: ABNORMAL RATIO UNITS
POTASSIUM SERPL-SCNC: 4.7 MMOL/L (ref 3.5–5.2)
PROT SERPL-MCNC: 6.7 G/DL (ref 6–8.5)
SODIUM SERPL-SCNC: 138 MMOL/L (ref 134–144)
TRIGL SERPL-MCNC: 850 MG/DL (ref 0–149)
VLDLC SERPL CALC-MCNC: ABNORMAL MG/DL (ref 5–40)

## 2017-11-22 RX ORDER — ROSUVASTATIN CALCIUM 40 MG/1
40 TABLET, COATED ORAL
Qty: 30 TAB | Refills: 3 | Status: SHIPPED | OUTPATIENT
Start: 2017-11-22 | End: 2018-01-17 | Stop reason: SDUPTHER

## 2017-11-27 ENCOUNTER — OFFICE VISIT (OUTPATIENT)
Dept: FAMILY MEDICINE CLINIC | Age: 52
End: 2017-11-27

## 2017-11-27 VITALS
DIASTOLIC BLOOD PRESSURE: 76 MMHG | HEIGHT: 68 IN | SYSTOLIC BLOOD PRESSURE: 113 MMHG | OXYGEN SATURATION: 96 % | TEMPERATURE: 96.3 F | RESPIRATION RATE: 16 BRPM | BODY MASS INDEX: 33.1 KG/M2 | WEIGHT: 218.4 LBS | HEART RATE: 86 BPM

## 2017-11-27 DIAGNOSIS — M43.10 SPONDYLOLISTHESIS, UNSPECIFIED SPINAL REGION: Primary | ICD-10-CM

## 2017-11-27 DIAGNOSIS — M54.50 CHRONIC MIDLINE LOW BACK PAIN WITHOUT SCIATICA: ICD-10-CM

## 2017-11-27 DIAGNOSIS — F43.10 PTSD (POST-TRAUMATIC STRESS DISORDER): ICD-10-CM

## 2017-11-27 DIAGNOSIS — G89.29 CHRONIC MIDLINE LOW BACK PAIN WITHOUT SCIATICA: ICD-10-CM

## 2017-11-27 DIAGNOSIS — F31.75 BIPOLAR I DISORDER, CURRENT OR MOST RECENT EPISODE DEPRESSED, IN PARTIAL REMISSION (HCC): ICD-10-CM

## 2017-11-27 NOTE — PROGRESS NOTES
HISTORY OF PRESENT ILLNESS  Asha Plunkett is a 47 y.o. male. Chief Complaint   Patient presents with    Referral Follow Up     wants referrals to Dr Bette Puri (ortho), Dr Nik Jones (counsler), and Dr Saint Hatcher at [facility]       HPI  Has new Insurance and switching to [address] benefits  needs referrals to see specialists    Has seen Dr Bette Puri for Spondylolisthesis last 2 w ago  Had steroid injection, but still has back pain  Had PT ordered and has it scheduled and needs new referral for that too    Needs also referral to therapist for PTSD and Psychiatrist for  Anxiety, Bipolar ds and PTSD  Has seen Dr Vic Gold in the past  Wants to see Dr Nurys Morris for counseling, has never seen him before    Doing well on current meds    ROS  Past Medical History:   Diagnosis Date    Anxiety disorder     Apnea 06/2017    B[facility] 1 disorder ([facility]     Depression     Diabetes ([facility]     GERD (gastroesophageal reflux disease)     Hypercholesterolemia     Parkinson disease ([facility] 10/2017    PTSD (post-traumatic stress disorder)     Spondylolisthesis     had steroid shot    Tardive dyskinesia      Current Outpatient Prescriptions   Medication Sig Dispense Refill    rosuvastatin (CRESTOR) 40 mg tablet Take 1 Tab by mouth nightly. 30 Tab 3    carbidopa-levodopa (SINEMET)  mg per tablet Take 1 Tab by mouth.  fenofibrate nanocrystallized (TRICOR) 145 mg tablet TAKE ONE TABLET BY MOUTH ONCE DAILY 30 Tab 5    omeprazole (PRILOSEC) 20 mg capsule TAKE ONE CAPSULE BY MOUTH ONCE DAILY 30 Cap 5    metFORMIN ER (GLUCOPHAGE XR) 500 mg tablet Take 1 Tab by mouth daily (with breakfast). 30 Tab 5    ARIPiprazole (ABILIFY) 15 mg tablet Take 1 Tab by mouth daily. 30 Tab 1    FLUoxetine (PROZAC) 40 mg capsule Take 1 Cap by mouth daily. 90 Cap 1    gabapentin (NEURONTIN) 300 mg capsule Take 1 Cap by mouth three (3) times daily. 90 Cap 5    buPROPion XL (WELLBUTRIN XL) 300 mg XL tablet Take 1 Tab by mouth every morning.  30 Tab 2    divalproex ER (DEPAKOTE ER) 500 mg ER tablet Take 3 Tabs by mouth daily. Takes 3 @@ hs. 90 Tab 2    clonazePAM (KLONOPIN) 0.5 mg tablet Take 1 Tab by mouth two (2) times a day. Max Daily Amount: 1 mg. May take additional dose for airplane flights. Max daily dose 1.5 mg 66 Tab 3    EPINEPHrine (EPIPEN) 0.3 mg/0.3 mL injection 0.3 mL by IntraMUSCular route once as needed for up to 1 dose. 2 Syringe prn    OMEGA-3/DHA/EPA/FISH OIL (FISH OIL HIGH POTENCY PO) Take  by mouth.  multivitamin (ONE A DAY) tablet Take 1 Tab by mouth daily.  aspirin 81 mg chewable tablet Take 81 mg by mouth daily.  Blood-Glucose Meter monitoring kit Check Glucose once a day for E 11.9 1 Kit 0     Allergies   Allergen Reactions    Venom-Honey Bee Anaphylaxis     Visit Vitals    /76 (BP 1 Location: Right arm, BP Patient Position: Sitting)    Pulse 86    Temp 96.3 °F (35.7 °C) (Oral)    Resp 16    Ht 5' 7.75\" (1.721 m)    Wt 218 lb 6.4 oz (99.1 kg)    SpO2 96%    BMI 33.45 kg/m2     Physical Exam   Constitutional: He is oriented to person, place, and time. He appears well-developed and well-nourished. No distress. HENT:   Head: Normocephalic and atraumatic. Eyes: Conjunctivae and EOM are normal.   Pulmonary/Chest: Effort normal.   Neurological: He is alert and oriented to person, place, and time. Psychiatric: He has a normal mood and affect. Nursing note and vitals reviewed. ASSESSMENT and PLAN    ICD-10-CM ICD-9-CM    1. Spondylolisthesis, unspecified spinal region M43.10 756.12 REFERRAL TO ORTHOPEDICS   2. Chronic midline low back pain without sciatica M54.5 724.2 REFERRAL TO PHYSICAL THERAPY    G89.29 338.29    3.  PTSD (post-traumatic stress disorder) F43.10 309.81 REFERRAL TO PSYCHIATRY   4. Bipolar I disorder, current or most recent episode depressed, in partial remission ([facility] F31.75 296.55 REFERRAL TO PSYCHIATRY

## 2018-01-17 ENCOUNTER — TELEPHONE (OUTPATIENT)
Dept: FAMILY MEDICINE CLINIC | Age: 53
End: 2018-01-17

## 2018-01-17 DIAGNOSIS — E78.00 HYPERCHOLESTEROLEMIA: ICD-10-CM

## 2018-01-17 DIAGNOSIS — E11.9 TYPE 2 DIABETES MELLITUS WITHOUT COMPLICATION, WITHOUT LONG-TERM CURRENT USE OF INSULIN (HCC): ICD-10-CM

## 2018-01-17 DIAGNOSIS — K21.9 GASTROESOPHAGEAL REFLUX DISEASE WITHOUT ESOPHAGITIS: ICD-10-CM

## 2018-01-17 RX ORDER — METFORMIN HYDROCHLORIDE 500 MG/1
500 TABLET, EXTENDED RELEASE ORAL
Qty: 90 TAB | Refills: 1 | Status: SHIPPED | OUTPATIENT
Start: 2018-01-17 | End: 2018-07-03 | Stop reason: SDUPTHER

## 2018-01-17 RX ORDER — FENOFIBRATE 145 MG/1
TABLET, COATED ORAL
Qty: 90 TAB | Refills: 1 | Status: SHIPPED | OUTPATIENT
Start: 2018-01-17 | End: 2018-07-03 | Stop reason: SDUPTHER

## 2018-01-17 RX ORDER — OMEPRAZOLE 20 MG/1
CAPSULE, DELAYED RELEASE ORAL
Qty: 90 CAP | Refills: 1 | Status: SHIPPED | OUTPATIENT
Start: 2018-01-17 | End: 2018-07-03 | Stop reason: SDUPTHER

## 2018-01-17 RX ORDER — ROSUVASTATIN CALCIUM 40 MG/1
40 TABLET, COATED ORAL
Qty: 90 TAB | Refills: 1 | Status: SHIPPED | OUTPATIENT
Start: 2018-01-17 | End: 2018-07-03 | Stop reason: SDUPTHER

## 2018-01-17 NOTE — TELEPHONE ENCOUNTER
Patient needs all new Rx's sent to [address], [facility].  FAX # [id number]  PHONE # [phone]

## 2018-01-22 ENCOUNTER — DOCUMENTATION ONLY (OUTPATIENT)
Dept: FAMILY MEDICINE CLINIC | Age: 53
End: 2018-01-22

## 2018-01-22 NOTE — PROGRESS NOTES
Called [facility] to see about getting the patient authorizations to see specialist per [facility] he under a mileage plan which means he has to travel 40 miles or more to see a [place] dr/ She told me he does require authorizations to see specialist but [facility] and they schedule for the patients he calls the # on his card [phone]. It also looks like there is an existing Jaky Alexandro pending for psychiatry and counseling on file.

## 2018-01-25 ENCOUNTER — OFFICE VISIT (OUTPATIENT)
Dept: FAMILY MEDICINE CLINIC | Age: 53
End: 2018-01-25

## 2018-01-25 VITALS
DIASTOLIC BLOOD PRESSURE: 80 MMHG | TEMPERATURE: 95.6 F | HEIGHT: 68 IN | SYSTOLIC BLOOD PRESSURE: 125 MMHG | RESPIRATION RATE: 16 BRPM | WEIGHT: 220.4 LBS | HEART RATE: 80 BPM | OXYGEN SATURATION: 97 % | BODY MASS INDEX: 33.4 KG/M2

## 2018-01-25 DIAGNOSIS — R25.1 TREMOR OF BOTH HANDS: Primary | ICD-10-CM

## 2018-01-25 RX ORDER — PROPRANOLOL HYDROCHLORIDE 40 MG/1
40 TABLET ORAL 2 TIMES DAILY
Qty: 60 TAB | Refills: 0 | Status: SHIPPED | OUTPATIENT
Start: 2018-01-25 | End: 2018-02-15 | Stop reason: SDUPTHER

## 2018-01-25 NOTE — PROGRESS NOTES
HISTORY OF PRESENT ILLNESS  Shubham Ho is a 47 y.o. male. Chief Complaint   Patient presents with    Tremors      f/u on new medication       HPI  Started having tremors for a while  At first mild  Now getting worse since last few months  On Sinemet now,  25/100 mg  Ordered by Neuro in [facility] to see another Neurologist that is approved by [place] system  Was diagnosed with Parkinsonism  Not sure Sinemet is helping    Tremors at rest and trying to do things  In hands and feet  Coming and going    ROS  Past Medical History:   Diagnosis Date    Anxiety disorder     Apnea 06/2017    Bipolar 1 disorder ([facility] [address] [facility]     Depression     Diabetes ([facility] [address] [facility]     GERD (gastroesophageal reflux disease)     Hypercholesterolemia     Parkinson disease ([facility] [address] 10/2017    PTSD (post-traumatic stress disorder)     Spondylolisthesis     had steroid shot    Tardive dyskinesia      Current Outpatient Prescriptions   Medication Sig Dispense Refill    propranolol (INDERAL) 40 mg tablet Take 1 Tab by mouth two (2) times a day. 60 Tab 0    rosuvastatin (CRESTOR) 40 mg tablet Take 1 Tab by mouth nightly. 90 Tab 1    fenofibrate nanocrystallized (TRICOR) 145 mg tablet TAKE ONE TABLET BY MOUTH ONCE DAILY 90 Tab 1    omeprazole (PRILOSEC) 20 mg capsule TAKE ONE CAPSULE BY MOUTH ONCE DAILY 90 Cap 1    metFORMIN ER (GLUCOPHAGE XR) 500 mg tablet Take 1 Tab by mouth daily (with breakfast). 90 Tab 1    carbidopa-levodopa (SINEMET)  mg per tablet Take 1 Tab by mouth.  ARIPiprazole (ABILIFY) 15 mg tablet Take 1 Tab by mouth daily. 30 Tab 1    FLUoxetine (PROZAC) 40 mg capsule Take 1 Cap by mouth daily. 90 Cap 1    gabapentin (NEURONTIN) 300 mg capsule Take 1 Cap by mouth three (3) times daily. 90 Cap 5    buPROPion XL (WELLBUTRIN XL) 300 mg XL tablet Take 1 Tab by mouth every morning. 30 Tab 2    divalproex ER (DEPAKOTE ER) 500 mg ER tablet Take 3 Tabs by mouth daily.  Takes 3 @@ hs. 90 Tab 2    clonazePAM (KLONOPIN) 0.5 mg tablet Take 1 Tab by mouth two (2) times a day. Max Daily Amount: 1 mg. May take additional dose for airplane flights. Max daily dose 1.5 mg 66 Tab 3    OMEGA-3/DHA/EPA/FISH OIL (FISH OIL HIGH POTENCY PO) Take  by mouth.  multivitamin (ONE A DAY) tablet Take 1 Tab by mouth daily.  aspirin 81 mg chewable tablet Take 81 mg by mouth daily.  Blood-Glucose Meter monitoring kit Check Glucose once a day for E 11.9 1 Kit 0    EPINEPHrine (EPIPEN) 0.3 mg/0.3 mL injection 0.3 mL by IntraMUSCular route once as needed for up to 1 dose. 2 Syringe prn     Allergies   Allergen Reactions    Venom-Honey Bee Anaphylaxis     Visit Vitals    /80 (BP 1 Location: Right arm, BP Patient Position: Sitting)    Pulse 80    Temp 95.6 °F (35.3 °C) (Oral)    Resp 16    Ht 5' 7.7\" (1.72 m)    Wt 220 lb 6.4 oz (100 kg)    SpO2 97%    BMI 33.81 kg/m2     Physical Exam   Constitutional: He is oriented to person, place, and time. He appears well-developed and well-nourished. No distress. HENT:   Head: Normocephalic and atraumatic. Eyes: Conjunctivae and EOM are normal.   Cardiovascular: Normal rate and regular rhythm. Pulmonary/Chest: Effort normal and breath sounds normal.   Neurological: He is alert and oriented to person, place, and time. Tremor at rest and with intention, fine and gross   Skin: Skin is warm and dry. Psychiatric: He has a normal mood and affect. Nursing note and vitals reviewed. ASSESSMENT and PLAN    ICD-10-CM ICD-9-CM    1.  Tremor of both hands R25.1 781.0 REFERRAL TO NEUROLOGY      propranolol (INDERAL) 40 mg tablet   trial of Propranolol given in the meantime

## 2018-01-25 NOTE — MR AVS SNAPSHOT
[facility] 
[phone] Patient: Chau Garcia MRN: [id number] [facility] Visit Information Date & Time Provider Department Dept. Phone Encounter #  
 1/25/2018 11:20 AM Ankit Davis MD [address] [place] [address] [phone] [id number] Upcoming Health Maintenance Date Due HEMOGLOBIN A1C Q6M 5/20/2018 FOOT EXAM Q1 7/5/2018 MICROALBUMIN Q1 7/5/2018 EYE EXAM RETINAL OR DILATED Q1 9/12/2018 LIPID PANEL Q1 11/20/2018 COLONOSCOPY 10/10/2021 DTaP/Tdap/Td series (2 - Td) 5/18/2027 Allergies as of 1/25/2018  Review Complete On: 1/25/2018 By: Michele Sanz LPN Severity Noted Reaction Type Reactions Venom-honey Bee High 07/14/2016    Anaphylaxis Current Immunizations  Reviewed on 11/17/2017 Name Date Influenza High Dose Vaccine PF 9/14/2015 Influenza Vaccine (Quad) PF 11/17/2017 Tdap 5/18/2017 Not reviewed this visit You Were Diagnosed With   
  
 Codes Comments Tremor of both hands    -  Primary ICD-10-CM: R25.1 ICD-9-CM: 277. 0 Vitals BP Pulse Temp Resp Height(growth percentile) Weight(growth percentile) 125/80 (BP 1 Location: Right arm, BP Patient Position: Sitting) 80 95.6 °F (35.3 °C) (Oral) 16 5' 7.7\" (1.72 m) 220 lb 6.4 oz (100 kg) SpO2 BMI Smoking Status 97% 33.81 kg/m2 Former Smoker Vitals History BMI and BSA Data Body Mass Index Body Surface Area  
 33.81 kg/m 2 2.19 m 2 Preferred Pharmacy Pharmacy Name Phone [address] [place] [address]  [place] [phone] Your Updated Medication List  
  
   
This list is accurate as of: 1/25/18 11:58 AM.  Always use your most recent med list.  
  
  
  
  
 ARIPiprazole 15 mg tablet Commonly known as:  ABILIFY Take 1 Tab by mouth daily. aspirin 81 mg chewable tablet Take 81 mg by mouth daily. Blood-Glucose Meter monitoring kit Check Glucose once a day for E 11.9 buPROPion  mg XL tablet Commonly known as:  Frank Slim Take 1 Tab by mouth every morning. carbidopa-levodopa  mg per tablet Commonly known as:  SINEMET Take 1 Tab by mouth.  
  
 clonazePAM 0.5 mg tablet Commonly known as:  Brenda Arslan Take 1 Tab by mouth two (2) times a day. Max Daily Amount: 1 mg. May take additional dose for airplane flights. Max daily dose 1.5 mg  
  
 divalproex  mg ER tablet Commonly known as:  DEPAKOTE ER Take 3 Tabs by mouth daily. Takes 3 @@ hs. EPINEPHrine 0.3 mg/0.3 mL injection Commonly known as:  EPIPEN  
0.3 mL by IntraMUSCular route once as needed for up to 1 dose. fenofibrate nanocrystallized 145 mg tablet Commonly known as:  TRICOR  
TAKE ONE TABLET BY MOUTH ONCE DAILY FISH OIL HIGH POTENCY PO Take  by mouth. FLUoxetine 40 mg capsule Commonly known as:  PROzac Take 1 Cap by mouth daily. gabapentin 300 mg capsule Commonly known as:  NEURONTIN Take 1 Cap by mouth three (3) times daily. metFORMIN  mg tablet Commonly known as:  GLUCOPHAGE XR Take 1 Tab by mouth daily (with breakfast). multivitamin tablet Commonly known as:  ONE A DAY Take 1 Tab by mouth daily. omeprazole 20 mg capsule Commonly known as:  PRILOSEC  
TAKE ONE CAPSULE BY MOUTH ONCE DAILY propranolol 40 mg tablet Commonly known as:  INDERAL Take 1 Tab by mouth two (2) times a day. rosuvastatin 40 mg tablet Commonly known as:  CRESTOR Take 1 Tab by mouth nightly. Prescriptions Printed Refills  
 propranolol (INDERAL) 40 mg tablet 0 Sig: Take 1 Tab by mouth two (2) times a day. Class: Print Route: Oral  
  
We Performed the Following REFERRAL TO NEUROLOGY [VAK44 Custom] Referral Information Referral ID Referred By Referred To [id number] WOLF-SMALL, Diego Sicard, MD   
   [address] [place] [address] [address] [place], [address] Phone: [phone] Fax: [phone] Visits Status Start Date End Date 1 Authorized 1/25/18 1/25/19 If your referral has a status of pending review or denied, additional information will be sent to support the outcome of this decision. Introducing [facility]! Dear William Truong: Thank you for requesting a [employer] account. Our records indicate that you already have an active [employer] account. You can access your account anytime at [email or web address] Did you know that you can access your hospital and ER discharge instructions at any time in [employer]? You can also review all of your test results from your hospital stay or ER visit. Additional Information If you have questions, please visit the Frequently Asked Questions section of the [employer] website at [email or web address] Remember, [employer] is NOT to be used for urgent needs. For medical emergencies, dial 911. Now available from your iPhone and Android! Please provide this summary of care documentation to your next provider. Your primary care clinician is listed as Aurelio Duane. If you have any questions after today's visit, please call [phone].

## 2018-02-15 ENCOUNTER — TELEPHONE (OUTPATIENT)
Dept: FAMILY MEDICINE CLINIC | Age: 53
End: 2018-02-15

## 2018-02-15 DIAGNOSIS — R25.1 TREMOR OF BOTH HANDS: ICD-10-CM

## 2018-02-15 RX ORDER — PROPRANOLOL HYDROCHLORIDE 40 MG/1
40 TABLET ORAL 2 TIMES DAILY
Qty: 60 TAB | Refills: 0 | Status: SHIPPED | OUTPATIENT
Start: 2018-02-15 | End: 2018-07-03 | Stop reason: SDUPTHER

## 2018-02-15 NOTE — TELEPHONE ENCOUNTER
I have called and advised the patient that the RX is written and available at the  for . Patient verbalizes understanding.

## 2018-04-12 ENCOUNTER — OFFICE VISIT (OUTPATIENT)
Dept: FAMILY MEDICINE CLINIC | Age: 53
End: 2018-04-12

## 2018-04-12 VITALS
SYSTOLIC BLOOD PRESSURE: 127 MMHG | RESPIRATION RATE: 18 BRPM | BODY MASS INDEX: 32.58 KG/M2 | OXYGEN SATURATION: 98 % | HEART RATE: 106 BPM | DIASTOLIC BLOOD PRESSURE: 83 MMHG | TEMPERATURE: 97.2 F | WEIGHT: 215 LBS | HEIGHT: 68 IN

## 2018-04-12 DIAGNOSIS — E78.00 HYPERCHOLESTEROLEMIA: ICD-10-CM

## 2018-04-12 DIAGNOSIS — F31.75 BIPOLAR I DISORDER, CURRENT OR MOST RECENT EPISODE DEPRESSED, IN PARTIAL REMISSION (HCC): Primary | ICD-10-CM

## 2018-04-12 DIAGNOSIS — Z13.29 THYROID DISORDER SCREEN: ICD-10-CM

## 2018-04-12 DIAGNOSIS — E11.9 TYPE 2 DIABETES MELLITUS WITHOUT COMPLICATION, WITHOUT LONG-TERM CURRENT USE OF INSULIN (HCC): ICD-10-CM

## 2018-04-12 LAB — HBA1C MFR BLD HPLC: 6.8 %

## 2018-04-12 NOTE — PROGRESS NOTES
Other labs pending, notify pt a1c has improved, looks much better. Cont with current tx and cont working on diet/ex per discussion at visit.

## 2018-04-12 NOTE — PROGRESS NOTES
Gisela Cunha is a 47 y.o. male who presents to the office today with the following:  Chief Complaint   Patient presents with    Follow-up     needs blood work as outside order       HPI   Here for routine labs. Has order by [facility] Dr. Misty Oliva. Reports doing well today with no complaints. Notes conditions are stable. Gets all meds through [place] now. Good on refills. Known PMH Hypercholesterolemia. Also saw Dr. Eduardo Zaidi back in the fall for fasting labs. Switched from Lipitor to Crestor just based on results. Tolerating this well. No myalgias. Lab Results   Component Value Date/Time    Cholesterol, total 201 (H) 11/20/2017 11:15 AM    HDL Cholesterol 18 (L) 11/20/2017 11:15 AM    LDL, calculated Comment 11/20/2017 11:15 AM    VLDL, calculated Comment 11/20/2017 11:15 AM    Triglyceride 850 (HH) 11/20/2017 11:15 AM     [id number]  Lab Results   Component Value Date/Time    Hemoglobin A1c 7.4 (H) 11/20/2017 11:15 AM    Hemoglobin A1c (POC) 6.8 04/12/2018 02:35 PM   FBS have been 130s-140s. No hyper or hypoglycemic episodes. Following wife's diet, trying to. Trying to drink less coca cola and more water. Still dealing with hand tremor. H/o tardive dyskinesia. Dr. Eduardo Zaidi previously tried on propranolol, previously on sinemet, pt did not like SE. Referred to Neuro. Considering trying Refugio Colbert- will discuss with specialist at apt soon. Otherwise feeling well with no other complaints or acute concerns. Review of Systems   Constitutional: Negative for fever, malaise/fatigue and weight loss. HENT: Negative. Respiratory: Negative for shortness of breath. Cardiovascular: Negative for chest pain and palpitations. Gastrointestinal: Negative. Genitourinary: Negative. Musculoskeletal: Negative for myalgias. Skin: Negative for rash. Neurological: Positive for tremors. No new sxs     See HPI.     Past Medical History:   Diagnosis Date    Anxiety disorder  Apnea 06/2017    Bipolar 1 disorder (HCC)     Depression     Diabetes (HCC)     GERD (gastroesophageal reflux disease)     Hypercholesterolemia     Parkinson disease ([facility] [address] 10/2017    PTSD (post-traumatic stress disorder)     Spondylolisthesis     had steroid shot    Tardive dyskinesia        Past Surgical History:   Procedure Laterality Date    HX CHOLECYSTECTOMY      HX COLONOSCOPY  09/2016    q5y    HX CYST REMOVAL  09/2017    cyst removed from throat    HX ENDOSCOPY  2011    HX OTHER SURGICAL      lump right chest, benign       Allergies   Allergen Reactions    Venom-Honey Bee Anaphylaxis       Current Outpatient Prescriptions   Medication Sig    propranolol (INDERAL) 40 mg tablet Take 1 Tab by mouth two (2) times a day.  rosuvastatin (CRESTOR) 40 mg tablet Take 1 Tab by mouth nightly.  fenofibrate nanocrystallized (TRICOR) 145 mg tablet TAKE ONE TABLET BY MOUTH ONCE DAILY    omeprazole (PRILOSEC) 20 mg capsule TAKE ONE CAPSULE BY MOUTH ONCE DAILY    metFORMIN ER (GLUCOPHAGE XR) 500 mg tablet Take 1 Tab by mouth daily (with breakfast).  carbidopa-levodopa (SINEMET)  mg per tablet Take 1 Tab by mouth.  ARIPiprazole (ABILIFY) 15 mg tablet Take 1 Tab by mouth daily.  FLUoxetine (PROZAC) 40 mg capsule Take 1 Cap by mouth daily.  gabapentin (NEURONTIN) 300 mg capsule Take 1 Cap by mouth three (3) times daily.  buPROPion XL (WELLBUTRIN XL) 300 mg XL tablet Take 1 Tab by mouth every morning.  divalproex ER (DEPAKOTE ER) 500 mg ER tablet Take 3 Tabs by mouth daily. Takes 3 @@ hs.  clonazePAM (KLONOPIN) 0.5 mg tablet Take 1 Tab by mouth two (2) times a day. Max Daily Amount: 1 mg. May take additional dose for airplane flights. Max daily dose 1.5 mg    EPINEPHrine (EPIPEN) 0.3 mg/0.3 mL injection 0.3 mL by IntraMUSCular route once as needed for up to 1 dose.  OMEGA-3/DHA/EPA/FISH OIL (FISH OIL HIGH POTENCY PO) Take  by mouth.     multivitamin (ONE A DAY) tablet Take 1 Tab by mouth daily.  aspirin 81 mg chewable tablet Take 81 mg by mouth daily.  Blood-Glucose Meter monitoring kit Check Glucose once a day for E 11.9     No current facility-administered medications for this visit. Social History     Social History    Marital status:      Spouse name: N/A    Number of children: N/A    Years of education: N/A     Social History Main Topics    Smoking status: Former Smoker     Types: Cigarettes     Quit date: 1/1/2005    Smokeless tobacco: Never Used    Alcohol use No    Drug use: No    Sexual activity: Yes     Partners: Female     Birth control/ protection: Pill     Other Topics Concern    None     Social History Narrative       Family History   Problem Relation Age of Onset    Cancer Maternal Grandmother     Stroke Maternal Grandfather     Cancer Mother      colon cancer    Alcohol abuse Mother     Drug Abuse Mother     Glaucoma Father     Hypertension Paternal Grandmother     Glaucoma Paternal Grandmother     Heart Disease Paternal Grandfather     Cancer Sister     No Known Problems Brother          Physical Exam:  Visit Vitals    /83 (BP 1 Location: Left arm, BP Patient Position: At rest)    Pulse (!) 106    Temp 97.2 °F (36.2 °C) (Oral)    Resp 18    Ht 5' 7.7\" (1.72 m)    Wt 215 lb (97.5 kg)    SpO2 98%    BMI 32.98 kg/m2     Physical Exam   Constitutional: He is oriented to person, place, and time and well-developed, well-nourished, and in no distress. HENT:   Head: Normocephalic and atraumatic. Eyes: Conjunctivae are normal.   Cardiovascular: Normal rate, regular rhythm and normal heart sounds. Pulmonary/Chest: Effort normal and breath sounds normal.   Abdominal: Soft. There is no tenderness. Musculoskeletal: He exhibits no edema. Neurological: He is alert and oriented to person, place, and time. He displays tremor (resting hand tremor, bilat). Gait normal.   Skin: Skin is warm and dry.    Psychiatric: Mood and affect normal.   Nursing note and vitals reviewed. Assessment/Plan:    ICD-10-CM ICD-9-CM    1. Bipolar I disorder, current or most recent episode depressed, in partial remission ([facility] F31.75 296.55 VALPROIC ACID      MA HANDLG&/OR CONVEY OF SPEC FOR TR OFFICE TO LAB      COLLECTION VENOUS BLOOD,VENIPUNCTURE      CBC WITH AUTOMATED DIFF   2. Hypercholesterolemia E78.00 272.0 LIPID PANEL   3. Type 2 diabetes mellitus without complication, without long-term current use of insulin ([facility]) E11.9 250.00 CBC WITH AUTOMATED DIFF      METABOLIC PANEL, COMPREHENSIVE      AMB POC HEMOGLOBIN A1C   4. Thyroid disorder screen Z13.29 V77.0 TSH 3RD GENERATION       Follow-up Disposition:  Return in about 3- 6 months, or sooner prn.     Jose Nichols PA-C

## 2018-04-13 LAB
ALBUMIN SERPL-MCNC: 4.4 G/DL (ref 3.5–5.5)
ALBUMIN/GLOB SERPL: 1.9 {RATIO} (ref 1.2–2.2)
ALP SERPL-CCNC: 37 IU/L (ref 39–117)
ALT SERPL-CCNC: 48 IU/L (ref 0–44)
AST SERPL-CCNC: 40 IU/L (ref 0–40)
BASOPHILS # BLD AUTO: 0.1 X10E3/UL (ref 0–0.2)
BASOPHILS NFR BLD AUTO: 1 %
BILIRUB SERPL-MCNC: 0.3 MG/DL (ref 0–1.2)
BUN SERPL-MCNC: 23 MG/DL (ref 6–24)
BUN/CREAT SERPL: 19 (ref 9–20)
CALCIUM SERPL-MCNC: 9.7 MG/DL (ref 8.7–10.2)
CHLORIDE SERPL-SCNC: 99 MMOL/L (ref 96–106)
CHOLEST SERPL-MCNC: 144 MG/DL (ref 100–199)
CO2 SERPL-SCNC: 25 MMOL/L (ref 18–29)
CREAT SERPL-MCNC: 1.19 MG/DL (ref 0.76–1.27)
EOSINOPHIL # BLD AUTO: 0.2 X10E3/UL (ref 0–0.4)
EOSINOPHIL NFR BLD AUTO: 3 %
ERYTHROCYTE [DISTWIDTH] IN BLOOD BY AUTOMATED COUNT: 15.1 % (ref 12.3–15.4)
GFR SERPLBLD CREATININE-BSD FMLA CKD-EPI: 69 ML/MIN/1.73
GFR SERPLBLD CREATININE-BSD FMLA CKD-EPI: 80 ML/MIN/1.73
GLOBULIN SER CALC-MCNC: 2.3 G/DL (ref 1.5–4.5)
GLUCOSE SERPL-MCNC: 123 MG/DL (ref 65–99)
HCT VFR BLD AUTO: 40.3 % (ref 37.5–51)
HDLC SERPL-MCNC: 24 MG/DL
HGB BLD-MCNC: 13.5 G/DL (ref 13–17.7)
IMM GRANULOCYTES # BLD: 0 X10E3/UL (ref 0–0.1)
IMM GRANULOCYTES NFR BLD: 0 %
INTERPRETATION, 910389: NORMAL
LDLC SERPL CALC-MCNC: ABNORMAL MG/DL (ref 0–99)
LYMPHOCYTES # BLD AUTO: 3.5 X10E3/UL (ref 0.7–3.1)
LYMPHOCYTES NFR BLD AUTO: 40 %
MCH RBC QN AUTO: 27.7 PG (ref 26.6–33)
MCHC RBC AUTO-ENTMCNC: 33.5 G/DL (ref 31.5–35.7)
MCV RBC AUTO: 83 FL (ref 79–97)
MONOCYTES # BLD AUTO: 0.5 X10E3/UL (ref 0.1–0.9)
MONOCYTES NFR BLD AUTO: 6 %
NEUTROPHILS # BLD AUTO: 4.5 X10E3/UL (ref 1.4–7)
NEUTROPHILS NFR BLD AUTO: 50 %
PLATELET # BLD AUTO: 213 X10E3/UL (ref 150–379)
POTASSIUM SERPL-SCNC: 4.4 MMOL/L (ref 3.5–5.2)
PROT SERPL-MCNC: 6.7 G/DL (ref 6–8.5)
RBC # BLD AUTO: 4.87 X10E6/UL (ref 4.14–5.8)
SODIUM SERPL-SCNC: 140 MMOL/L (ref 134–144)
TRIGL SERPL-MCNC: 438 MG/DL (ref 0–149)
TSH SERPL DL<=0.005 MIU/L-ACNC: 2.14 UIU/ML (ref 0.45–4.5)
VALPROATE SERPL-MCNC: 73 UG/ML (ref 50–100)
VLDLC SERPL CALC-MCNC: ABNORMAL MG/DL (ref 5–40)
WBC # BLD AUTO: 8.7 X10E3/UL (ref 3.4–10.8)

## 2018-04-13 NOTE — PROGRESS NOTES
Notify pt valproic acid in range- please fax to ordering physician. Cbc okay. Thyroid wnl. On LFT still slightly elevated, but stable and kidney tests wnl. Trig and hdl chol have improved since starting tricor. Cont tx and keep working on diet.

## 2018-05-29 ENCOUNTER — OFFICE VISIT (OUTPATIENT)
Dept: FAMILY MEDICINE CLINIC | Age: 53
End: 2018-05-29

## 2018-05-29 VITALS
HEART RATE: 84 BPM | BODY MASS INDEX: 33.16 KG/M2 | DIASTOLIC BLOOD PRESSURE: 80 MMHG | WEIGHT: 218.8 LBS | SYSTOLIC BLOOD PRESSURE: 110 MMHG | TEMPERATURE: 97.5 F | HEIGHT: 68 IN | OXYGEN SATURATION: 95 % | RESPIRATION RATE: 16 BRPM

## 2018-05-29 DIAGNOSIS — R79.89 ELEVATED LIVER FUNCTION TESTS: ICD-10-CM

## 2018-05-29 DIAGNOSIS — R53.83 FATIGUE, UNSPECIFIED TYPE: Primary | ICD-10-CM
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
## 2018-05-29 LAB
BILIRUB UR QL STRIP: NEGATIVE
GLUCOSE POC: 122 MG/DL
GLUCOSE UR-MCNC: NEGATIVE MG/DL
KETONES P FAST UR STRIP-MCNC: NEGATIVE MG/DL
PH UR STRIP: 7 [PH] (ref 4.6–8)
PROT UR QL STRIP: NEGATIVE
SP GR UR STRIP: 1.02 (ref 1–1.03)
UA UROBILINOGEN AMB POC: NORMAL (ref 0.2–1)
URINALYSIS CLARITY POC: CLEAR
URINALYSIS COLOR POC: YELLOW
URINE BLOOD POC: NEGATIVE
URINE LEUKOCYTES POC: NEGATIVE
URINE NITRITES POC: NEGATIVE

## 2018-05-29 NOTE — PROGRESS NOTES
HISTORY OF PRESENT ILLNESS  Garima Moore is a 47 y.o. male. Chief Complaint   Patient presents with    Fatigue     for a couple weeks       HPI  A lot of fatigue since 2 w ago  10-12 hours a night and 2 h naps    No recent changes to meds    Has seen tick recently  Has pets  [place] outdoors [place]  concerned re Lyme ds    Wife sick with sim sy recently  Resolved on its own    Had recent TSH that was nl    No SE with meds    Review of Systems   Constitutional: Positive for malaise/fatigue. Negative for chills and fever. HENT: Negative for congestion. Eyes: Positive for blurred vision (little in am when waking up). Negative for double vision. Respiratory: Positive for shortness of breath (little). Negative for cough and wheezing. Cardiovascular: Negative for chest pain and palpitations. Gastrointestinal: Positive for diarrhea (mild from time to time). Negative for abdominal pain, blood in stool, melena, nausea and vomiting. Genitourinary: Negative for dysuria and hematuria. Musculoskeletal: Positive for back pain, joint pain (knees) and myalgias (all over). Neurological: Positive for headaches. Psychiatric/Behavioral: The patient does not have insomnia.       Past Medical History:   Diagnosis Date    Anxiety disorder     Apnea 06/2017    [facility] disorder ([facility] [address]     Depression     Diabetes (HCC)     GERD (gastroesophageal reflux disease)     Hypercholesterolemia     Parkinson disease ([facility] [address] 10/2017    PTSD (post-traumatic stress disorder)     Spondylolisthesis     had steroid shot    Tardive dyskinesia        Past Surgical History:   Procedure Laterality Date    HX CHOLECYSTECTOMY      HX COLONOSCOPY  09/2016    q5y    HX CYST REMOVAL  09/2017    cyst removed from throat    HX ENDOSCOPY  2011    HX OTHER SURGICAL      lump right chest, benign       Current Outpatient Prescriptions   Medication Sig Dispense Refill    propranolol (INDERAL) 40 mg tablet Take 1 Tab by mouth two (2) times a day. 60 Tab 0    rosuvastatin (CRESTOR) 40 mg tablet Take 1 Tab by mouth nightly. 90 Tab 1    fenofibrate nanocrystallized (TRICOR) 145 mg tablet TAKE ONE TABLET BY MOUTH ONCE DAILY 90 Tab 1    omeprazole (PRILOSEC) 20 mg capsule TAKE ONE CAPSULE BY MOUTH ONCE DAILY 90 Cap 1    metFORMIN ER (GLUCOPHAGE XR) 500 mg tablet Take 1 Tab by mouth daily (with breakfast). 90 Tab 1    carbidopa-levodopa (SINEMET)  mg per tablet Take 1 Tab by mouth.  ARIPiprazole (ABILIFY) 15 mg tablet Take 1 Tab by mouth daily. 30 Tab 1    FLUoxetine (PROZAC) 40 mg capsule Take 1 Cap by mouth daily. 90 Cap 1    gabapentin (NEURONTIN) 300 mg capsule Take 1 Cap by mouth three (3) times daily. 90 Cap 5    buPROPion XL (WELLBUTRIN XL) 300 mg XL tablet Take 1 Tab by mouth every morning. 30 Tab 2    divalproex ER (DEPAKOTE ER) 500 mg ER tablet Take 3 Tabs by mouth daily. Takes 3 @@ hs. 90 Tab 2    clonazePAM (KLONOPIN) 0.5 mg tablet Take 1 Tab by mouth two (2) times a day. Max Daily Amount: 1 mg. May take additional dose for airplane flights. Max daily dose 1.5 mg (Patient taking differently: Take 0.5 mg by mouth two (2) times a day. May take additional dose for airplane flights. Max daily dose 1.5 mg,usually dose 1.5.daily.) 66 Tab 3    EPINEPHrine (EPIPEN) 0.3 mg/0.3 mL injection 0.3 mL by IntraMUSCular route once as needed for up to 1 dose. 2 Syringe prn    OMEGA-3/DHA/EPA/FISH OIL (FISH OIL HIGH POTENCY PO) Take  by mouth.  multivitamin (ONE A DAY) tablet Take 1 Tab by mouth daily.  aspirin 81 mg chewable tablet Take 81 mg by mouth daily.       Blood-Glucose Meter monitoring kit Check Glucose once a day for E 11.9 1 Kit 0       Allergies   Allergen Reactions    Venom-Honey Bee Anaphylaxis       Visit Vitals    /80 (BP 1 Location: Left arm, BP Patient Position: Sitting)    Pulse 84    Temp 97.5 °F (36.4 °C) (Oral)    Resp 16    Ht 5' 7.7\" (1.72 m)    Wt 218 lb 12.8 oz (99.2 kg)    SpO2 95%    BMI 33.56 kg/m2       Physical Exam   Constitutional: He is oriented to person, place, and time. He appears well-developed and well-nourished. No distress. HENT:   Head: Normocephalic and atraumatic. Mouth/Throat: Oropharynx is clear and moist. No oropharyngeal exudate. Eyes: Conjunctivae and EOM are normal. Pupils are equal, round, and reactive to light. Cardiovascular: Normal rate, regular rhythm and normal heart sounds. Pulmonary/Chest: Effort normal and breath sounds normal.   Abdominal: Soft. He exhibits no distension. There is no tenderness. Musculoskeletal: He exhibits no edema. Lymphadenopathy:     He has no cervical adenopathy. Neurological: He is alert and oriented to person, place, and time. Skin: Skin is warm and dry. No rash noted. Psychiatric: He has a normal mood and affect. Nursing note and vitals reviewed. Recent Results (from the past 12 hour(s))   AMB POC GLUCOSE BLOOD, BY GLUCOSE MONITORING DEVICE    Collection Time: 05/29/18  4:40 PM   Result Value Ref Range    Glucose  mg/dL   AMB POC URINALYSIS DIP STICK MANUAL W/O MICRO    Collection Time: 05/29/18  4:44 PM   Result Value Ref Range    Color (UA POC) Yellow     Clarity (UA POC) Clear     Glucose (UA POC) Negative Negative    Bilirubin (UA POC) Negative Negative    Ketones (UA POC) Negative Negative    Specific gravity (UA POC) 1.020 1.001 - 1.035    Blood (UA POC) Negative Negative    pH (UA POC) 7 4.6 - 8.0    Protein (UA POC) Negative Negative    Urobilinogen (UA POC) 0.2 mg/dL 0.2 - 1    Nitrites (UA POC) Negative Negative    Leukocyte esterase (UA POC) Negative Negative       ASSESSMENT and PLAN    ICD-10-CM ICD-9-CM    1. Fatigue, unspecified type R53.83 780.79 AMB POC EKG ROUTINE W/ 12 LEADS, INTER & REP      METABOLIC PANEL, COMPREHENSIVE      CBC WITH AUTOMATED DIFF      AMB POC GLUCOSE BLOOD, BY GLUCOSE MONITORING DEVICE      AMB POC URINALYSIS DIP STICK MANUAL W/O MICRO   2.  Tick bite, initial encounter W57. XXXA 919.4 LYME AB TOTAL W/RFLX W BLOT     E906.4 PA HANDLG&/OR CONVEY OF SPEC FOR TR OFFICE TO LAB      COLLECTION VENOUS BLOOD,VENIPUNCTURE

## 2018-05-31 LAB
ALBUMIN SERPL-MCNC: 4.5 G/DL (ref 3.5–5.5)
ALBUMIN/GLOB SERPL: 2 {RATIO} (ref 1.2–2.2)
ALP SERPL-CCNC: 46 IU/L (ref 39–117)
ALT SERPL-CCNC: 70 IU/L (ref 0–44)
AST SERPL-CCNC: 64 IU/L (ref 0–40)
B BURGDOR IGG+IGM SER-ACNC: <0.91 ISR (ref 0–0.9)
BASOPHILS # BLD AUTO: 0 X10E3/UL (ref 0–0.2)
BASOPHILS NFR BLD AUTO: 0 %
BILIRUB SERPL-MCNC: 0.3 MG/DL (ref 0–1.2)
BUN SERPL-MCNC: 17 MG/DL (ref 6–24)
BUN/CREAT SERPL: 16 (ref 9–20)
CALCIUM SERPL-MCNC: 9.8 MG/DL (ref 8.7–10.2)
CHLORIDE SERPL-SCNC: 99 MMOL/L (ref 96–106)
CO2 SERPL-SCNC: 26 MMOL/L (ref 18–29)
CREAT SERPL-MCNC: 1.09 MG/DL (ref 0.76–1.27)
EOSINOPHIL # BLD AUTO: 0.1 X10E3/UL (ref 0–0.4)
EOSINOPHIL NFR BLD AUTO: 1 %
ERYTHROCYTE [DISTWIDTH] IN BLOOD BY AUTOMATED COUNT: 16.2 % (ref 12.3–15.4)
GFR SERPLBLD CREATININE-BSD FMLA CKD-EPI: 77 ML/MIN/1.73
GFR SERPLBLD CREATININE-BSD FMLA CKD-EPI: 89 ML/MIN/1.73
GLOBULIN SER CALC-MCNC: 2.3 G/DL (ref 1.5–4.5)
GLUCOSE SERPL-MCNC: 137 MG/DL (ref 65–99)
HCT VFR BLD AUTO: 40.1 % (ref 37.5–51)
HGB BLD-MCNC: 13.4 G/DL (ref 13–17.7)
IMM GRANULOCYTES # BLD: 0 X10E3/UL (ref 0–0.1)
IMM GRANULOCYTES NFR BLD: 0 %
LYMPHOCYTES # BLD AUTO: 3.2 X10E3/UL (ref 0.7–3.1)
LYMPHOCYTES NFR BLD AUTO: 35 %
MCH RBC QN AUTO: 27.7 PG (ref 26.6–33)
MCHC RBC AUTO-ENTMCNC: 33.4 G/DL (ref 31.5–35.7)
MCV RBC AUTO: 83 FL (ref 79–97)
MONOCYTES # BLD AUTO: 0.7 X10E3/UL (ref 0.1–0.9)
MONOCYTES NFR BLD AUTO: 7 %
NEUTROPHILS # BLD AUTO: 4.9 X10E3/UL (ref 1.4–7)
NEUTROPHILS NFR BLD AUTO: 57 %
PLATELET # BLD AUTO: 228 X10E3/UL (ref 150–379)
POTASSIUM SERPL-SCNC: 4.3 MMOL/L (ref 3.5–5.2)
PROT SERPL-MCNC: 6.8 G/DL (ref 6–8.5)
RBC # BLD AUTO: 4.84 X10E6/UL (ref 4.14–5.8)
SODIUM SERPL-SCNC: 138 MMOL/L (ref 134–144)
WBC # BLD AUTO: 8.9 X10E3/UL (ref 3.4–10.8)

## 2018-05-31 NOTE — PROGRESS NOTES
Call pt, the kidney and electrolyte tests are normal  The liver tests are a little higher, please add on a Mitochondrial Ab and set up for liver US  The CBC shows like there could be a viral infection  The Lyme test is negative

## 2018-06-01 ENCOUNTER — DOCUMENTATION ONLY (OUTPATIENT)
Dept: FAMILY MEDICINE CLINIC | Age: 53
End: 2018-06-01

## 2018-06-01 NOTE — PROGRESS NOTES
Patient has orders for [address] he wants it done at [facility] wasn't on the order and knows you will be calling to set up

## 2018-06-03 LAB
MITOCHONDRIA M2 IGG SER-ACNC: 4.4 UNITS (ref 0–20)
SPECIMEN STATUS REPORT, ROLRST: NORMAL

## 2018-07-03 ENCOUNTER — OFFICE VISIT (OUTPATIENT)
Dept: FAMILY MEDICINE CLINIC | Age: 53
End: 2018-07-03

## 2018-07-03 VITALS
WEIGHT: 220 LBS | RESPIRATION RATE: 16 BRPM | DIASTOLIC BLOOD PRESSURE: 76 MMHG | BODY MASS INDEX: 34.53 KG/M2 | OXYGEN SATURATION: 96 % | HEART RATE: 84 BPM | TEMPERATURE: 97.1 F | SYSTOLIC BLOOD PRESSURE: 113 MMHG | HEIGHT: 67 IN

## 2018-07-03 DIAGNOSIS — K21.9 GASTROESOPHAGEAL REFLUX DISEASE WITHOUT ESOPHAGITIS: ICD-10-CM

## 2018-07-03 DIAGNOSIS — E78.00 HYPERCHOLESTEROLEMIA: ICD-10-CM

## 2018-07-03 DIAGNOSIS — E66.9 OBESITY (BMI 30.0-34.9): ICD-10-CM

## 2018-07-03 DIAGNOSIS — E11.9 TYPE 2 DIABETES MELLITUS WITHOUT COMPLICATION, WITHOUT LONG-TERM CURRENT USE OF INSULIN (HCC): Primary | ICD-10-CM

## 2018-07-03 DIAGNOSIS — R25.1 TREMOR OF BOTH HANDS: ICD-10-CM

## 2018-07-03 RX ORDER — PROPRANOLOL HYDROCHLORIDE 40 MG/1
40 TABLET ORAL 2 TIMES DAILY
Qty: 180 TAB | Refills: 1 | Status: SHIPPED | OUTPATIENT
Start: 2018-07-03 | End: 2019-02-01 | Stop reason: SDUPTHER

## 2018-07-03 RX ORDER — FENOFIBRATE 145 MG/1
TABLET, COATED ORAL
Qty: 90 TAB | Refills: 1 | Status: SHIPPED | OUTPATIENT
Start: 2018-07-03 | End: 2019-02-01 | Stop reason: SDUPTHER

## 2018-07-03 RX ORDER — VILAZODONE HYDROCHLORIDE 40 MG/1
20 TABLET ORAL DAILY
COMMUNITY

## 2018-07-03 RX ORDER — OMEPRAZOLE 20 MG/1
CAPSULE, DELAYED RELEASE ORAL
Qty: 90 CAP | Refills: 1 | Status: SHIPPED | OUTPATIENT
Start: 2018-07-03 | End: 2019-02-01 | Stop reason: SDUPTHER

## 2018-07-03 RX ORDER — ROSUVASTATIN CALCIUM 40 MG/1
40 TABLET, COATED ORAL
Qty: 90 TAB | Refills: 1 | Status: SHIPPED | OUTPATIENT
Start: 2018-07-03 | End: 2019-02-01 | Stop reason: SDUPTHER

## 2018-07-03 RX ORDER — METFORMIN HYDROCHLORIDE 500 MG/1
500 TABLET, EXTENDED RELEASE ORAL
Qty: 90 TAB | Refills: 1 | Status: SHIPPED | OUTPATIENT
Start: 2018-07-03 | End: 2018-07-03 | Stop reason: SDUPTHER

## 2018-07-03 RX ORDER — METFORMIN HYDROCHLORIDE 500 MG/1
TABLET, EXTENDED RELEASE ORAL
Qty: 180 TAB | Refills: 1 | Status: SHIPPED | OUTPATIENT
Start: 2018-07-03 | End: 2018-10-23 | Stop reason: SDUPTHER

## 2018-07-03 NOTE — PROGRESS NOTES
Chief Complaint   Patient presents with    Diabetes     needs medication refills     Health Maintenance Due   Topic Date Due    FOOT EXAM Q1  07/05/2018    MICROALBUMIN Q1  07/05/2018     Visit Vitals    /76 (BP 1 Location: Left arm, BP Patient Position: Sitting)    Pulse 84    Temp 97.1 °F (36.2 °C) (Oral)    Resp 16    Ht 5' 7\" (1.702 m)    Wt 220 lb (99.8 kg)    SpO2 96%    BMI 34.46 kg/m2     1. Have you been to the ER, urgent care clinic since your last visit? Hospitalized since your last visit? No    2. Have you seen or consulted any other health care providers outside of the [address] [place] [address] since your last visit? Include any pap smears or colon screening.  No    Citlaly Cannon LPN

## 2018-07-03 NOTE — PROGRESS NOTES
HISTORY OF PRESENT ILLNESS  Kierra Barraza is a 47 y.o. male. Chief Complaint   Patient presents with    Diabetes     needs medication refills       HPI  DM   in pm usually  Needs all Rx's    Hyperlipidemia  Fasting this am for Triglycerides  No SE with meds    Changing from Prozac to Viibryd  Followed by Psych    Obesity  Working on diet and exercise  Walking in [facility]      Review of Systems   Constitutional: Negative for weight loss. Respiratory: Negative for cough and shortness of breath. Cardiovascular: Negative for chest pain and palpitations. Neurological: Negative for dizziness, tingling, sensory change and headaches. Past Medical History:   Diagnosis Date    Anxiety disorder     Apnea 06/2017    B[facility] ([facility]     Depression     Diabetes (HCC)     Fatty liver     GERD (gastroesophageal reflux disease)     Hypercholesterolemia     Parkinson disease ([facility] 10/2017    PTSD (post-traumatic stress disorder)     Spondylolisthesis     had steroid shot    Tardive dyskinesia        Past Surgical History:   Procedure Laterality Date    HX CHOLECYSTECTOMY      HX COLONOSCOPY  09/2016    q5y    HX CYST REMOVAL  09/2017    cyst removed from throat    HX ENDOSCOPY  2011    HX OTHER SURGICAL      lump right chest, benign       Current Outpatient Prescriptions   Medication Sig Dispense Refill    vilazodone (VIIBRYD) 40 mg tab tablet Take 20 mg by mouth daily. Indications: pt takin 20mg X1 wk then 40mg daily      propranolol (INDERAL) 40 mg tablet Take 1 Tab by mouth two (2) times a day. 180 Tab 1    rosuvastatin (CRESTOR) 40 mg tablet Take 1 Tab by mouth nightly. 90 Tab 1    fenofibrate nanocrystallized (TRICOR) 145 mg tablet TAKE ONE TABLET BY MOUTH ONCE DAILY 90 Tab 1    omeprazole (PRILOSEC) 20 mg capsule TAKE ONE CAPSULE BY MOUTH ONCE DAILY 90 Cap 1    metFORMIN ER (GLUCOPHAGE XR) 500 mg tablet Take 1 Tab by mouth daily (with breakfast).  90 Tab 1    carbidopa-levodopa (SINEMET)  mg per tablet Take 1 Tab by mouth.  ARIPiprazole (ABILIFY) 15 mg tablet Take 1 Tab by mouth daily. 30 Tab 1    FLUoxetine (PROZAC) 40 mg capsule Take 1 Cap by mouth daily. (Patient taking differently: Take 40 mg by mouth daily. Indications: pt on taper, taking 20mg for 1 more week then stop) 90 Cap 1    gabapentin (NEURONTIN) 300 mg capsule Take 1 Cap by mouth three (3) times daily. 90 Cap 5    buPROPion XL (WELLBUTRIN XL) 300 mg XL tablet Take 1 Tab by mouth every morning. 30 Tab 2    divalproex ER (DEPAKOTE ER) 500 mg ER tablet Take 3 Tabs by mouth daily. Takes 3 @@ hs. 90 Tab 2    clonazePAM (KLONOPIN) 0.5 mg tablet Take 1 Tab by mouth two (2) times a day. Max Daily Amount: 1 mg. May take additional dose for airplane flights. Max daily dose 1.5 mg (Patient taking differently: Take 0.5 mg by mouth two (2) times a day. May take additional dose for airplane flights. Max daily dose 1.5 mg,usually dose 1.5.daily.) 66 Tab 3    EPINEPHrine (EPIPEN) 0.3 mg/0.3 mL injection 0.3 mL by IntraMUSCular route once as needed for up to 1 dose. 2 Syringe prn    OMEGA-3/DHA/EPA/FISH OIL (FISH OIL HIGH POTENCY PO) Take  by mouth.  multivitamin (ONE A DAY) tablet Take 1 Tab by mouth daily.  aspirin 81 mg chewable tablet Take 81 mg by mouth daily.  Blood-Glucose Meter monitoring kit Check Glucose once a day for E 11.9 1 Kit 0       Allergies   Allergen Reactions    Venom-Honey Bee Anaphylaxis       Visit Vitals    /76 (BP 1 Location: Left arm, BP Patient Position: Sitting)    Pulse 84    Temp 97.1 °F (36.2 °C) (Oral)    Resp 16    Ht 5' 7\" (1.702 m)    Wt 220 lb (99.8 kg)    SpO2 96%    BMI 34.46 kg/m2     Physical Exam   Constitutional: He is oriented to person, place, and time. He appears well-developed and well-nourished. No distress. HENT:   Head: Normocephalic and atraumatic.    Right Ear: External ear normal.   Left Ear: External ear normal.   Mouth/Throat: Oropharynx is clear and moist. No oropharyngeal exudate. Eyes: Conjunctivae are normal. Pupils are equal, round, and reactive to light. Cardiovascular: Normal rate and regular rhythm. Pulmonary/Chest: Effort normal and breath sounds normal.   Musculoskeletal: He exhibits no edema. Lymphadenopathy:     He has no cervical adenopathy. Neurological: He is alert and oriented to person, place, and time. Skin: Skin is warm and dry. Psychiatric: He has a normal mood and affect. Nursing note and vitals reviewed. POC HbA1C accidentally drawn and was 8.4    ASSESSMENT and PLAN    ICD-10-CM ICD-9-CM    1. Type 2 diabetes mellitus without complication, without long-term current use of insulin (HCC) E11.9 250.00 AMB POC HEMOGLOBIN A1C      HM DIABETES FOOT EXAM      MICROALBUMIN, UR, RAND W/ MICROALB/CREAT RATIO      metFORMIN ER (GLUCOPHAGE XR) 500 mg tablet   2. Hypercholesterolemia E78.00 272.0 LIPID PANEL WITH LDL/HDL RATIO      METABOLIC PANEL, COMPREHENSIVE      rosuvastatin (CRESTOR) 40 mg tablet      fenofibrate nanocrystallized (TRICOR) 145 mg tablet   3. Tremor of both hands R25.1 781.0 propranolol (INDERAL) 40 mg tablet   4. Gastroesophageal reflux disease without esophagitis K21.9 530.81 omeprazole (PRILOSEC) 20 mg capsule   5. Obesity (BMI 30.0-34. 9) E66.9 278.00    Diet, exercise and weight loss discussed.   Increased Glucophage to bid  Monitor Glucose and F/U in 2 w

## 2018-07-05 ENCOUNTER — TELEPHONE (OUTPATIENT)
Dept: FAMILY MEDICINE CLINIC | Age: 53
End: 2018-07-05

## 2018-07-05 LAB
ALBUMIN SERPL-MCNC: 4.3 G/DL (ref 3.5–5.5)
ALBUMIN/CREAT UR: 33.6 MG/G CREAT (ref 0–30)
ALBUMIN/GLOB SERPL: 2 {RATIO} (ref 1.2–2.2)
ALP SERPL-CCNC: 49 IU/L (ref 39–117)
ALT SERPL-CCNC: 59 IU/L (ref 0–44)
AST SERPL-CCNC: 46 IU/L (ref 0–40)
BILIRUB SERPL-MCNC: 0.3 MG/DL (ref 0–1.2)
BUN SERPL-MCNC: 10 MG/DL (ref 6–24)
BUN/CREAT SERPL: 9 (ref 9–20)
CALCIUM SERPL-MCNC: 9.8 MG/DL (ref 8.7–10.2)
CHLORIDE SERPL-SCNC: 100 MMOL/L (ref 96–106)
CHOLEST SERPL-MCNC: 154 MG/DL (ref 100–199)
CO2 SERPL-SCNC: 28 MMOL/L (ref 20–29)
CREAT SERPL-MCNC: 1.14 MG/DL (ref 0.76–1.27)
CREAT UR-MCNC: 226.4 MG/DL
GLOBULIN SER CALC-MCNC: 2.2 G/DL (ref 1.5–4.5)
GLUCOSE SERPL-MCNC: 164 MG/DL (ref 65–99)
HDLC SERPL-MCNC: 21 MG/DL
INTERPRETATION, 910389: NORMAL
LDLC SERPL CALC-MCNC: ABNORMAL MG/DL (ref 0–99)
LDLC/HDLC SERPL: ABNORMAL RATIO
MICROALBUMIN UR-MCNC: 76 UG/ML
POTASSIUM SERPL-SCNC: 4.5 MMOL/L (ref 3.5–5.2)
PROT SERPL-MCNC: 6.5 G/DL (ref 6–8.5)
SODIUM SERPL-SCNC: 143 MMOL/L (ref 134–144)
TRIGL SERPL-MCNC: 610 MG/DL (ref 0–149)
VLDLC SERPL CALC-MCNC: ABNORMAL MG/DL (ref 5–40)

## 2018-07-05 RX ORDER — LISINOPRIL 2.5 MG/1
2.5 TABLET ORAL DAILY
Qty: 90 TAB | Refills: 3 | Status: SHIPPED | OUTPATIENT
Start: 2018-07-05 | End: 2019-02-01 | Stop reason: SDUPTHER

## 2018-07-05 RX ORDER — LISINOPRIL 2.5 MG/1
2.5 TABLET ORAL DAILY
Qty: 90 TAB | Refills: 3 | Status: SHIPPED | OUTPATIENT
Start: 2018-07-05 | End: 2018-07-05 | Stop reason: SDUPTHER

## 2018-07-05 NOTE — PROGRESS NOTES
Call pt, the Triglycerides are again very high at 610, And the good Chol is low  Start Niacin  mg at night and cont Crestor and Tricor  Avoid fatty, fast and sweet foods and recheck in 3 mo FASTING  The Urine shows some protein leakage   I am starting him on Lisinopril 2.5 mg to help protect the kidneys  The liver tests are a little better, still up, cont to avoid ETOH and Tylenol and try to loose some weight

## 2018-07-16 ENCOUNTER — OFFICE VISIT (OUTPATIENT)
Dept: FAMILY MEDICINE CLINIC | Age: 53
End: 2018-07-16

## 2018-07-16 VITALS
DIASTOLIC BLOOD PRESSURE: 72 MMHG | WEIGHT: 211.8 LBS | HEIGHT: 67 IN | RESPIRATION RATE: 16 BRPM | HEART RATE: 78 BPM | SYSTOLIC BLOOD PRESSURE: 121 MMHG | TEMPERATURE: 97.9 F | BODY MASS INDEX: 33.24 KG/M2

## 2018-07-16 DIAGNOSIS — E11.9 TYPE 2 DIABETES MELLITUS WITHOUT COMPLICATION, WITHOUT LONG-TERM CURRENT USE OF INSULIN (HCC): Primary | ICD-10-CM

## 2018-07-16 LAB — HBA1C MFR BLD HPLC: 7.7 %

## 2018-07-16 NOTE — PROGRESS NOTES
Chief Complaint   Patient presents with    Diabetes     f/u     There are no preventive care reminders to display for this patient. 1. Have you been to the ER, urgent care clinic since your last visit? Hospitalized since your last visit? No    2. Have you seen or consulted any other health care providers outside of the [address] [place] [address] since your last visit? Include any pap smears or colon screening.  Yes When: [facility]  Visit Vitals    /72 (BP 1 Location: Right arm, BP Patient Position: Sitting)    Pulse 78    Temp 97.9 °F (36.6 °C) (Oral)    Resp 16    Ht 5' 7\" (1.702 m)    Wt 211 lb 12.8 oz (96.1 kg)    BMI 33.17 kg/m2

## 2018-07-16 NOTE — PROGRESS NOTES
Tien Coello is a 47 y.o. male who presents to the office today with the following:  Chief Complaint   Patient presents with    Diabetes     f/u       HPI  Here for A1c. Just had visit recently with Dr. Edith Mcnulty with all other labs. Was \"accidentally checked\" and was 8.?  Dr. Edith Mcnulty increased his Metformin to 500mg BID a couple weeks ago. Pt has tolerated change well. No hypoglycemic episodes. No foot or nerve sxs complaints, n/t/w/s. Is working on LMs. Has lost 8 lbs. Otherwise feeling well with no other complaints or acute concerns. Review of Systems   Constitutional: Negative for malaise/fatigue. HENT: Negative. Eyes: Negative. Respiratory: Negative. Cardiovascular: Negative. Gastrointestinal: Negative. Genitourinary: Negative. Skin: Negative for rash. Neurological: Negative. See HPI. Past Medical History:   Diagnosis Date    Anxiety disorder     Apnea 06/2017    Bipolar 1 disorder ([facility]     Depression     Diabetes (HCC)     Fatty liver     GERD (gastroesophageal reflux disease)     Hypercholesterolemia     Parkinson disease ([facility] 10/2017    PTSD (post-traumatic stress disorder)     Spondylolisthesis     had steroid shot    Tardive dyskinesia        Past Surgical History:   Procedure Laterality Date    HX CHOLECYSTECTOMY      HX COLONOSCOPY  09/2016    q5y    HX CYST REMOVAL  09/2017    cyst removed from throat    HX ENDOSCOPY  2011    HX OTHER SURGICAL      lump right chest, benign       Allergies   Allergen Reactions    Venom-Honey Bee Anaphylaxis       Current Outpatient Prescriptions   Medication Sig    lisinopril (PRINIVIL, ZESTRIL) 2.5 mg tablet Take 1 Tab by mouth daily.  vilazodone (VIIBRYD) 40 mg tab tablet Take 20 mg by mouth daily. Indications: pt takin 20mg X1 wk then 40mg daily    propranolol (INDERAL) 40 mg tablet Take 1 Tab by mouth two (2) times a day.  rosuvastatin (CRESTOR) 40 mg tablet Take 1 Tab by mouth nightly.     fenofibrate nanocrystallized (TRICOR) 145 mg tablet TAKE ONE TABLET BY MOUTH ONCE DAILY    omeprazole (PRILOSEC) 20 mg capsule TAKE ONE CAPSULE BY MOUTH ONCE DAILY    metFORMIN ER (GLUCOPHAGE XR) 500 mg tablet bid    carbidopa-levodopa (SINEMET)  mg per tablet Take 1 Tab by mouth.  ARIPiprazole (ABILIFY) 15 mg tablet Take 1 Tab by mouth daily.  gabapentin (NEURONTIN) 300 mg capsule Take 1 Cap by mouth three (3) times daily.  buPROPion XL (WELLBUTRIN XL) 300 mg XL tablet Take 1 Tab by mouth every morning.  divalproex ER (DEPAKOTE ER) 500 mg ER tablet Take 3 Tabs by mouth daily. Takes 3 @@ hs.  clonazePAM (KLONOPIN) 0.5 mg tablet Take 1 Tab by mouth two (2) times a day. Max Daily Amount: 1 mg. May take additional dose for airplane flights. Max daily dose 1.5 mg (Patient taking differently: Take 0.5 mg by mouth two (2) times a day. May take additional dose for airplane flights. Max daily dose 1.5 mg,usually dose 1.5.daily.)    OMEGA-3/DHA/EPA/FISH OIL (FISH OIL HIGH POTENCY PO) Take  by mouth.  multivitamin (ONE A DAY) tablet Take 1 Tab by mouth daily.  aspirin 81 mg chewable tablet Take 81 mg by mouth daily.  Blood-Glucose Meter monitoring kit Check Glucose once a day for E 11.9    FLUoxetine (PROZAC) 40 mg capsule Take 1 Cap by mouth daily. (Patient taking differently: Take 40 mg by mouth daily. Indications: pt on taper, taking 20mg for 1 more week then stop)    EPINEPHrine (EPIPEN) 0.3 mg/0.3 mL injection 0.3 mL by IntraMUSCular route once as needed for up to 1 dose. No current facility-administered medications for this visit.         Social History     Social History    Marital status:      Spouse name: N/A    Number of children: N/A    Years of education: N/A     Social History Main Topics    Smoking status: Former Smoker     Types: Cigarettes     Quit date: 1/1/2005    Smokeless tobacco: Never Used    Alcohol use No    Drug use: No    Sexual activity: Yes Partners: Female     Birth control/ protection: Pill     Other Topics Concern    None     Social History Narrative       Family History   Problem Relation Age of Onset    Cancer Maternal Grandmother     Stroke Maternal Grandfather     Cancer Mother      colon cancer    Alcohol abuse Mother     Drug Abuse Mother     Glaucoma Father     Hypertension Paternal Grandmother     Glaucoma Paternal Grandmother     Heart Disease Paternal Grandfather     Cancer Sister     No Known Problems Brother          Physical Exam:  Visit Vitals    /72 (BP 1 Location: Right arm, BP Patient Position: Sitting)    Pulse 78    Temp 97.9 °F (36.6 °C) (Oral)    Resp 16    Ht 5' 7\" (1.702 m)    Wt 211 lb 12.8 oz (96.1 kg)    BMI 33.17 kg/m2     Physical Exam   Constitutional: He is oriented to person, place, and time and well-developed, well-nourished, and in no distress. HENT:   Head: Normocephalic and atraumatic. Right Ear: External ear normal.   Left Ear: External ear normal.   Eyes: Conjunctivae are normal.   Neck: Normal range of motion. Cardiovascular: Normal rate, regular rhythm and normal heart sounds. Pulmonary/Chest: Effort normal and breath sounds normal.   Musculoskeletal: He exhibits no edema. Lymphadenopathy:     He has no cervical adenopathy. Neurological: He is alert and oriented to person, place, and time. Gait normal.   Skin: Skin is warm and dry. Psychiatric: Mood and affect normal.   Nursing note and vitals reviewed. Assessment/Plan:    ICD-10-CM ICD-9-CM    1. Type 2 diabetes mellitus without complication, without long-term current use of insulin ([facility]) E11.9 250.00 AMB POC HEMOGLOBIN A1C     Results for orders placed or performed in visit on 07/16/18   AMB POC HEMOGLOBIN A1C   Result Value Ref Range    Hemoglobin A1c (POC) 7.7 %     Reiterated LMs and diet. Will recheck in 3 mo. Rtc/notify if any concerns sooner.     Follow-up Disposition:  Return in about 3 months (around 10/16/2018), or sooner prn.     Sumi Tran PA-C

## 2018-08-29 ENCOUNTER — OFFICE VISIT (OUTPATIENT)
Dept: FAMILY MEDICINE CLINIC | Age: 53
End: 2018-08-29

## 2018-08-29 VITALS
RESPIRATION RATE: 16 BRPM | SYSTOLIC BLOOD PRESSURE: 101 MMHG | DIASTOLIC BLOOD PRESSURE: 67 MMHG | TEMPERATURE: 97.7 F | WEIGHT: 205 LBS | HEART RATE: 87 BPM | HEIGHT: 67 IN | BODY MASS INDEX: 32.18 KG/M2

## 2018-08-29 DIAGNOSIS — Z23 ENCOUNTER FOR IMMUNIZATION: ICD-10-CM

## 2018-08-29 DIAGNOSIS — F31.13 SEVERE MANIC BIPOLAR I DISORDER WITHOUT PSYCHOTIC FEATURES (HCC): Primary | ICD-10-CM

## 2018-08-29 NOTE — PROGRESS NOTES
1. Have you been to the ER, urgent care clinic since your last visit? Hospitalized since your last visit? No 
 
2. Have you seen or consulted any other health care providers outside of the [address] [place] [address] since your last visit? Include any pap smears or colon screening.  No

## 2018-08-29 NOTE — MR AVS SNAPSHOT
[facility] 
[phone] Patient: Juan Luis Engle MRN: [id number] Mable Cushing Visit Information Date & Time Provider Department Dept. Phone Encounter #  
 8/29/2018  9:30 AM Arturo Flowers PA-C [facility] [phone] [id number] Upcoming Health Maintenance Date Due Influenza Age 5 to Adult 8/1/2018 EYE EXAM RETINAL OR DILATED Q1 9/12/2018 HEMOGLOBIN A1C Q6M 1/16/2019 FOOT EXAM Q1 7/3/2019 MICROALBUMIN Q1 7/3/2019 LIPID PANEL Q1 7/3/2019 COLONOSCOPY 10/10/2021 DTaP/Tdap/Td series (2 - Td) 5/18/2027 Allergies as of 8/29/2018  Review Complete On: 8/29/2018 By: Arturo Flowers PA-C Severity Noted Reaction Type Reactions Venom-honey Bee High 07/14/2016    Anaphylaxis Current Immunizations  Reviewed on 11/17/2017 Name Date Influenza High Dose Vaccine PF 9/14/2015 Influenza Vaccine (Quad) PF 11/17/2017 Tdap 5/18/2017 Not reviewed this visit Vitals BP Pulse Temp Resp Height(growth percentile) Weight(growth percentile) 101/67 (BP 1 Location: Right arm, BP Patient Position: Sitting) 87 97.7 °F (36.5 °C) (Oral) 16 5' 7\" (1.702 m) 205 lb (93 kg) BMI Smoking Status 32.11 kg/m2 Former Smoker Vitals History BMI and BSA Data Body Mass Index Body Surface Area  
 32.11 kg/m 2 2.1 m 2 Preferred Pharmacy [facility] Name [facility] [place] [address] [place] [phone] Your Updated Medication List  
  
   
This list is accurate as of 8/29/18 10:17 AM.  Always use your most recent med list.  
  
  
  
  
 ARIPiprazole 15 mg tablet Commonly known as:  ABILIFY Take 1 Tab by mouth daily. aspirin 81 mg chewable tablet Take 81 mg by mouth daily. Blood-Glucose Meter monitoring kit Check Glucose once a day for E 11.9 buPROPion  mg XL tablet Commonly known as:  Mike Alberto Take 1 Tab by mouth every morning. carbidopa-levodopa  mg per tablet Commonly known as:  SINEMET Take 1 Tab by mouth.  
  
 clonazePAM 0.5 mg tablet Commonly known as:  Jael Slay Take 1 Tab by mouth two (2) times a day. Max Daily Amount: 1 mg. May take additional dose for airplane flights. Max daily dose 1.5 mg  
  
 divalproex  mg ER tablet Commonly known as:  DEPAKOTE ER Take 3 Tabs by mouth daily. Takes 3 @@ hs. EPINEPHrine 0.3 mg/0.3 mL injection Commonly known as:  EPIPEN  
0.3 mL by IntraMUSCular route once as needed for up to 1 dose. fenofibrate nanocrystallized 145 mg tablet Commonly known as:  TRICOR  
TAKE ONE TABLET BY MOUTH ONCE DAILY FISH OIL HIGH POTENCY PO Take  by mouth. FLUoxetine 40 mg capsule Commonly known as:  PROzac Take 1 Cap by mouth daily. gabapentin 300 mg capsule Commonly known as:  NEURONTIN Take 1 Cap by mouth three (3) times daily. lisinopril 2.5 mg tablet Commonly known as:  Nonah Kim Take 1 Tab by mouth daily. metFORMIN  mg tablet Commonly known as:  GLUCOPHAGE XR  
bid  
  
 multivitamin tablet Commonly known as:  ONE A DAY Take 1 Tab by mouth daily. omeprazole 20 mg capsule Commonly known as:  PRILOSEC  
TAKE ONE CAPSULE BY MOUTH ONCE DAILY propranolol 40 mg tablet Commonly known as:  INDERAL Take 1 Tab by mouth two (2) times a day. rosuvastatin 40 mg tablet Commonly known as:  CRESTOR Take 1 Tab by mouth nightly. VIIBRYD 40 mg Tab tablet Generic drug:  vilazodone Take 20 mg by mouth daily. Indications: pt takin 20mg X1 wk then 40mg daily Introducing [facility]! Dear Kimmy Talamantes: Thank you for requesting a [employer] account. Our records indicate that you already have an active [employer] account.   You can access your account anytime at [email or web address] Did you know that you can access your hospital and ER discharge instructions at any time in [employer]? You can also review all of your test results from your hospital stay or ER visit. Additional Information If you have questions, please visit the Frequently Asked Questions section of the [employer] website at [email or web address] Remember, [employer] is NOT to be used for urgent needs. For medical emergencies, dial 911. Now available from your iPhone and Android! Please provide this summary of care documentation to your next provider. Your primary care clinician is listed as Tamir Kuhn. If you have any questions after today's visit, please call [phone]. [place]

## 2018-08-29 NOTE — PROGRESS NOTES
Kassie Lerma is a 46 y.o. male who presents to the office today with the following: Chief Complaint Patient presents with  
 Other  
  wants order for shingles shot,and blood work HPI Here for written Rx for Shingrix vaccine. Also with lab order from psychiatry for valproic acid level. Health Maintenance Due Topic Date Due  Influenza Age 5 to Adult  08/01/2018 today  EYE EXAM RETINAL OR DILATED Q1  09/12/2018 due in Oct. Jose Carlson. Will send MR Plans to return in October for FBW per Dr. Stuart Mohan rec. Has tolerated new med/adj well. Has lost weight, working on LMs. BS have improved. Otherwise feeling well with no other complaints or acute concerns. Review of Systems Constitutional: Negative for chills, fever and malaise/fatigue. HENT: Negative. Eyes: Negative. Respiratory: Negative for cough and shortness of breath. Cardiovascular: Negative for chest pain. Gastrointestinal: Negative. Genitourinary: Negative. Musculoskeletal: Negative for myalgias. Skin: Negative for rash. Neurological:  
     Stable. Psychiatric/Behavioral:  
     Stable. See HPI. Past Medical History:  
Diagnosis Date  Anxiety disorder  Apnea 06/2017  Bipolar 1 disorder ([facility]  Depression  Diabetes ([facility]  Fatty liver  GERD (gastroesophageal reflux disease)  Hypercholesterolemia  Parkinson disease ([facility] 10/2017  PTSD (post-traumatic stress disorder)  Spondylolisthesis   
 had steroid shot  Tardive dyskinesia Past Surgical History:  
Procedure Laterality Date  HX CHOLECYSTECTOMY  HX COLONOSCOPY  09/2016  
 q5y  HX CYST REMOVAL  09/2017  
 cyst removed from throat  HX ENDOSCOPY  2011  HX OTHER SURGICAL    
 lump right chest, benign Allergies Allergen Reactions  Venom-Honey Bee Anaphylaxis Current Outpatient Prescriptions Medication Sig  
  lisinopril (PRINIVIL, ZESTRIL) 2.5 mg tablet Take 1 Tab by mouth daily.  vilazodone (VIIBRYD) 40 mg tab tablet Take 20 mg by mouth daily. Indications: pt takin 20mg X1 wk then 40mg daily  propranolol (INDERAL) 40 mg tablet Take 1 Tab by mouth two (2) times a day.  rosuvastatin (CRESTOR) 40 mg tablet Take 1 Tab by mouth nightly.  fenofibrate nanocrystallized (TRICOR) 145 mg tablet TAKE ONE TABLET BY MOUTH ONCE DAILY  omeprazole (PRILOSEC) 20 mg capsule TAKE ONE CAPSULE BY MOUTH ONCE DAILY  metFORMIN ER (GLUCOPHAGE XR) 500 mg tablet bid  carbidopa-levodopa (SINEMET)  mg per tablet Take 1 Tab by mouth.  ARIPiprazole (ABILIFY) 15 mg tablet Take 1 Tab by mouth daily.  FLUoxetine (PROZAC) 40 mg capsule Take 1 Cap by mouth daily. (Patient taking differently: Take 40 mg by mouth daily. Indications: pt on taper, taking 20mg for 1 more week then stop)  gabapentin (NEURONTIN) 300 mg capsule Take 1 Cap by mouth three (3) times daily.  buPROPion XL (WELLBUTRIN XL) 300 mg XL tablet Take 1 Tab by mouth every morning.  divalproex ER (DEPAKOTE ER) 500 mg ER tablet Take 3 Tabs by mouth daily. Takes 3 @@ hs.  clonazePAM (KLONOPIN) 0.5 mg tablet Take 1 Tab by mouth two (2) times a day. Max Daily Amount: 1 mg. May take additional dose for airplane flights. Max daily dose 1.5 mg (Patient taking differently: Take 0.5 mg by mouth two (2) times a day. May take additional dose for airplane flights. Max daily dose 1.5 mg,usually dose 1.5.daily.)  OMEGA-3/DHA/EPA/FISH OIL (FISH OIL HIGH POTENCY PO) Take  by mouth.  multivitamin (ONE A DAY) tablet Take 1 Tab by mouth daily.  aspirin 81 mg chewable tablet Take 81 mg by mouth daily.  Blood-Glucose Meter monitoring kit Check Glucose once a day for E 11.9  EPINEPHrine (EPIPEN) 0.3 mg/0.3 mL injection 0.3 mL by IntraMUSCular route once as needed for up to 1 dose. No current facility-administered medications for this visit. Social History Social History  Marital status:  Spouse name: N/A  
 Number of children: N/A  
 Years of education: N/A Social History Main Topics  Smoking status: Former Smoker Types: Cigarettes Quit date: 1/1/2005  Smokeless tobacco: Never Used  Alcohol use No  
 Drug use: No  
 Sexual activity: Yes  
  Partners: Female Birth control/ protection: Pill Other Topics Concern  None Social History Narrative Family History Problem Relation Age of Onset  Cancer Maternal Grandmother  Stroke Maternal Grandfather  Cancer Mother   
  colon cancer  Alcohol abuse Mother  Drug Abuse Mother  Glaucoma Father  Hypertension Paternal Grandmother  Glaucoma Paternal Grandmother  Heart Disease Paternal Grandfather  Cancer Sister  No Known Problems Brother Physical Exam: 
Visit Vitals  /67 (BP 1 Location: Right arm, BP Patient Position: Sitting)  Pulse 87  Temp 97.7 °F (36.5 °C) (Oral)  Resp 16  
 Ht 5' 7\" (1.702 m)  Wt 205 lb (93 kg)  BMI 32.11 kg/m2 Physical Exam  
Constitutional: He is oriented to person, place, and time and well-developed, well-nourished, and in no distress. HENT:  
Head: Normocephalic and atraumatic. Eyes: Conjunctivae are normal.  
Neck: Neck supple. Cardiovascular: Normal rate and regular rhythm. Pulmonary/Chest: Effort normal and breath sounds normal.  
Neurological: He is alert and oriented to person, place, and time. Gait normal.  
Skin: Skin is warm and dry. Psychiatric: Mood and affect normal.  
Nursing note and vitals reviewed. Assessment/Plan: ICD-10-CM ICD-9-CM 1. Severe manic bipolar I disorder without psychotic features [facility] F31.13 296.43 VALPROIC ACID Results to psych. [place] HAND[facility]&/OR CONVEY OF SPEC FOR TR OFFICE TO LAB  
   COLLECTION VENOUS BLOOD,VENIPUNCTURE 2.  Encounter for immunization Z23 V03.89 INFLUENZA VIRUS VAC QUAD,SPLIT,PRESV FREE SYRINGE IM Tolerated injection w/o difficulty. Follow-up Disposition: 
Return in about 2 months (around 10/29/2018), or sooner prn, for routine f/u with FBW.  
 
Terrance Aviles PA-C

## 2018-08-30 LAB — VALPROATE SERPL-MCNC: 60 UG/ML (ref 50–100)

## 2018-10-16 DIAGNOSIS — E11.9 TYPE 2 DIABETES MELLITUS WITHOUT COMPLICATION, WITHOUT LONG-TERM CURRENT USE OF INSULIN (HCC): ICD-10-CM

## 2018-10-16 NOTE — TELEPHONE ENCOUNTER
Requested Prescriptions     Pending Prescriptions Disp Refills    metFORMIN ER (GLUCOPHAGE XR) 500 mg tablet 180 Tab 1     Sig: bid

## 2018-10-22 RX ORDER — METFORMIN HYDROCHLORIDE 500 MG/1
TABLET, EXTENDED RELEASE ORAL
Qty: 180 TAB | Refills: 1 | OUTPATIENT
Start: 2018-10-22

## 2018-10-23 RX ORDER — METFORMIN HYDROCHLORIDE 500 MG/1
TABLET, EXTENDED RELEASE ORAL
Qty: 180 TAB | Refills: 1 | Status: SHIPPED | OUTPATIENT
Start: 2018-10-23 | End: 2019-05-09 | Stop reason: SDUPTHER

## 2018-10-23 NOTE — TELEPHONE ENCOUNTER
I have refilled the medication but it says it will not e-scribe to this pharmacy. I know we usually send for him but I do not recall ever having to fax anything. He can pick it up or see if we can send for him. Thank you.

## 2019-03-21 ENCOUNTER — TELEPHONE (OUTPATIENT)
Dept: FAMILY MEDICINE CLINIC | Age: 54
End: 2019-03-21

## 2020-03-27 NOTE — PROGRESS NOTES
Zain Sotelo is a 45 y.o. male who presents to the office today with the following:  Chief Complaint   Patient presents with    Back Pain     patient wants a referral to get an MRI or something for low back pain       HPI  Here for acute on chronic LBP. Reports hx Spondylolisthesis. Last checked [facility] years ago (pt says ~8yrs since)  Had epidural inj. Worked for a couple months, then pain returned. Ortho spine told him could continue injections, but the next step may be surgery. Was told would only get worse overtime. Has not been to Ortho in 8-9 years. Has been managing since with Aleve for pain. Has to sit up straight, pain worse with slouching. Otherwise no major palliative/provoking factors. Was  before had to quit, bouncing did not help. Lately with numbness down lateral left LE, which has not happened since last seen ~8yrs ago (has only had some tingling since). Denies fever/chills, night sweats, no weakness, swelling, or other n/t. Denies any injuries or hx trauma. Pain varies between 4-6/10 throughout the day. Worse the more active he is. Also notes his BS have been good. Still dieting. -140s. Due for A1c in April. Also notes was never told anything about switching statin per my lab note. Would like to switch to further address triglycerides. Is taking Omega-3. Has had no other statin meds in the past.  Has tolerated previous well with no SEs. Denies myalgias. ROS   See HPI. Allergies   Allergen Reactions    Venom-Honey Bee Anaphylaxis       Current Outpatient Prescriptions   Medication Sig    gabapentin (NEURONTIN) 100 mg capsule 100 mg three (3) times daily.  EPINEPHrine (EPIPEN) 0.3 mg/0.3 mL injection 0.3 mL by IntraMUSCular route once as needed for up to 1 dose.  lovastatin (MEVACOR) 20 mg tablet Take 1 Tab by mouth nightly.  metFORMIN ER (GLUCOPHAGE XR) 500 mg tablet Take 1 Tab by mouth daily (with breakfast).     ARIPiprazole (ABILIFY) 15 mg tablet Take 15 mg by mouth daily.  Omeprazole delayed release (PRILOSEC D/R) 20 mg tablet Take 1 Tab by mouth daily.  OMEGA-3/DHA/EPA/FISH OIL (FISH OIL HIGH POTENCY PO) Take  by mouth.  multivitamin (ONE A DAY) tablet Take 1 Tab by mouth daily.  divalproex DR (DEPAKOTE) 500 mg tablet Take 500 mg by mouth three (3) times daily. Takes ER and takes 1500 mg once a day    clonazePAM (KLONOPIN) 0.5 mg tablet Take 0.5 mg by mouth two (2) times a day.  FLUoxetine (PROZAC) 40 mg capsule Take 40 mg by mouth daily.  aspirin 81 mg chewable tablet Take 81 mg by mouth daily.  Blood-Glucose Meter monitoring kit Check Glucose once a day for E 11.9     No current facility-administered medications for this visit.         Past Medical History:   Diagnosis Date    Anxiety disorder     [facility] disorder [facility] [address]     Depression     [facility]     GERD (gastroesophageal reflux disease)     Hypercholesterolemia     PTSD (post-traumatic stress disorder)     Spondylolisthesis     had steroid shot    Tardive dyskinesia        Past Surgical History:   Procedure Laterality Date    HX CHOLECYSTECTOMY      HX COLONOSCOPY  09/2016    [id number]    HX ENDOSCOPY  2011    HX OTHER SURGICAL      lump right chest, benign       Social History     Social History    Marital status:      Spouse name: N/A    Number of children: N/A    Years of education: N/A     Social History Main Topics    Smoking status: Former Smoker     Types: Cigarettes     Quit date: 1/1/2005    Smokeless tobacco: Never Used    Alcohol use No    Drug use: No    Sexual activity: Not Asked     Other Topics Concern    None     Social History Narrative       Family History   Problem Relation Age of Onset    Cancer Maternal Grandmother     Stroke Maternal Grandfather     Cancer Mother      colon cancer    Glaucoma Father     Hypertension Paternal Grandmother     Glaucoma Paternal Grandmother     Heart Disease Paternal Grandfather Physical Exam:  Visit Vitals    /83 (BP 1 Location: Left arm, BP Patient Position: Sitting)    Pulse 83    Temp 97.5 °F (36.4 °C) (Temporal)    Resp 16    Ht 5' 8\" (1.727 m)    Wt 217 lb 3.2 oz (98.5 kg)    SpO2 97%    BMI 33.03 kg/m2     Physical Exam   Constitutional: He is oriented to person, place, and time and well-developed, well-nourished, and in no distress. HENT:   Head: Normocephalic and atraumatic. Eyes: Conjunctivae are normal.   Neck: Normal range of motion. Neck supple. Cardiovascular: Normal rate and regular rhythm. Pulmonary/Chest: Effort normal and breath sounds normal.   Abdominal: Soft. There is no tenderness. Musculoskeletal:        Lumbar back: Normal.   Pt has FROM of lumbar spine with some pain in full flexion only. LE exam also unremarkable, except for some reported decrease in sensation to light touch to lateral aspect of left LE vs right LE. Otherwise CSM intact throughout remaining LE exam.   Lymphadenopathy:     He has no cervical adenopathy. Neurological: He is alert and oriented to person, place, and time. Gait normal.   Skin: Skin is warm and dry. Psychiatric: Mood and affect normal.       Assessment/Plan:    ICD-10-CM ICD-9-CM    1. Chronic midline low back pain with left-sided sciatica M54.42 724.2 indomethacin (INDOCIN) 50 mg capsule    G89.29 724.3 REFERRAL TO ORTHOPEDIC SURGERY     338.29 - Pt agrees, would like to have Ortho do further imaging as will need to f/u with them anyways. 2. Spondylolisthesis of lumbar region M43.16 738.4 indomethacin (INDOCIN) 50 mg capsule  - may supplement with Tylenol but instructed to d/c other nsaids.  - Take with full meals.  - Denies GI hx/sx. - Counseled pt on potential medication AEs/interactions. REFERRAL TO ORTHOPEDIC SURGERY   3.  Hypercholesterolemia E78.00 272.0 atorvastatin (LIPITOR) 20 mg tablet       Follow-up Disposition:  Return in about 2 months (around 4/28/2017), or sooner prn, for labs, f/u chronic conditions.     Shannon Prajapati, PA-C - xray showing possible left tibial plateau fx  - pt requests tylenol with codeine  - f/u CT pelvis  - will get CT left knee as well - xray showing possible left tibial plateau fx, but ortho does not think it is  - CT pelvis showing left acetabular nondisplaced fx and left inf rami fx  - pt requests tylenol with codeine  - per ortho, call them again once pt is ruled out for covid 19  - toe touch weight bearing on left - xray showing possible left tibial plateau fx, but ortho does not think it is  - CT pelvis showing left acetabular nondisplaced fx and left inf rami fx  - pt requests tylenol with codeine  - per ortho, call them again once pt is ruled out for covid 19. ortho states there is no acute intervention at this time  - toe touch weight bearing on left